# Patient Record
Sex: FEMALE | Race: WHITE | NOT HISPANIC OR LATINO | Employment: OTHER | URBAN - METROPOLITAN AREA
[De-identification: names, ages, dates, MRNs, and addresses within clinical notes are randomized per-mention and may not be internally consistent; named-entity substitution may affect disease eponyms.]

---

## 2021-04-21 ENCOUNTER — OFFICE VISIT (OUTPATIENT)
Dept: URGENT CARE | Facility: PHYSICIAN GROUP | Age: 31
End: 2021-04-21
Payer: COMMERCIAL

## 2021-04-21 VITALS
BODY MASS INDEX: 21.33 KG/M2 | SYSTOLIC BLOOD PRESSURE: 120 MMHG | WEIGHT: 144 LBS | OXYGEN SATURATION: 96 % | HEIGHT: 69 IN | TEMPERATURE: 98.2 F | RESPIRATION RATE: 12 BRPM | DIASTOLIC BLOOD PRESSURE: 72 MMHG | HEART RATE: 77 BPM

## 2021-04-21 DIAGNOSIS — R06.02 SOB (SHORTNESS OF BREATH): ICD-10-CM

## 2021-04-21 DIAGNOSIS — J30.2 SEASONAL ALLERGIES: ICD-10-CM

## 2021-04-21 PROCEDURE — 99203 OFFICE O/P NEW LOW 30 MIN: CPT | Performed by: PHYSICIAN ASSISTANT

## 2021-04-21 RX ORDER — ALBUTEROL SULFATE 90 UG/1
2 AEROSOL, METERED RESPIRATORY (INHALATION) EVERY 6 HOURS PRN
Qty: 8.5 G | Refills: 0 | Status: SHIPPED | OUTPATIENT
Start: 2021-04-21

## 2021-04-21 ASSESSMENT — ENCOUNTER SYMPTOMS
SPUTUM PRODUCTION: 0
DIZZINESS: 0
VOMITING: 0
SHORTNESS OF BREATH: 0
NAUSEA: 0
ABDOMINAL PAIN: 0
FEVER: 0
CHILLS: 0
DIARRHEA: 0
COUGH: 1
MUSCULOSKELETAL NEGATIVE: 1
RHINORRHEA: 1
WHEEZING: 0
HEADACHES: 0
SORE THROAT: 0
CARDIOVASCULAR NEGATIVE: 1

## 2021-04-21 NOTE — PROGRESS NOTES
Subjective:      Lenore Beard is a 30 y.o. female who presents with Cough (runny nose x 7 days)            Seasonal allergies for last 7 days.  Clear rhinorrhea with dry cough.  Itchy eyes and throat.  Some tight chest without significant shortness of breath.  No fever, chills, sore throat, wheezing.  No vomiting or diarrhea.  No dark-colored mucus.  Patient is breast-feeding and has been hesitant to take OTC meds.  Long history of seasonal allergies with subsequent bronchitis.  No sick contacts.  No loss of taste or smell.    Cough  This is a new problem. The current episode started in the past 7 days. The problem has been unchanged. The problem occurs constantly. The cough is non-productive. Associated symptoms include postnasal drip and rhinorrhea. Pertinent negatives include no chills, ear pain, fever, headaches, nasal congestion, rash, sore throat, shortness of breath or wheezing. The symptoms are aggravated by pollens. Treatments tried: NSAIDS. The treatment provided mild relief. Her past medical history is significant for environmental allergies. There is no history of asthma or pneumonia.       PMH:  has no past medical history on file.  MEDS: No current outpatient medications on file.  ALLERGIES:   Allergies   Allergen Reactions   • Amoxicillin Hives     Pt broke out in hives after taking   • Pcn [Penicillins] Hives     Patient broke out in hives after taking     SURGHX: No past surgical history on file.  SOCHX:  reports that she has never smoked. She has never used smokeless tobacco.  FH: family history is not on file.      Review of Systems   Constitutional: Negative for chills and fever.   HENT: Positive for congestion (clear), postnasal drip and rhinorrhea. Negative for ear pain and sore throat.    Respiratory: Positive for cough. Negative for sputum production, shortness of breath and wheezing.    Cardiovascular: Negative.    Gastrointestinal: Negative for abdominal pain, diarrhea, nausea and vomiting.  "  Musculoskeletal: Negative.    Skin: Negative for rash.   Neurological: Negative for dizziness and headaches.   Endo/Heme/Allergies: Positive for environmental allergies.       Medications, Allergies, and current problem list reviewed today in Epic     Objective:     /72 (BP Location: Left arm, Patient Position: Sitting, BP Cuff Size: Adult)   Pulse 77   Temp 36.8 °C (98.2 °F)   Resp 12   Ht 1.753 m (5' 9\")   Wt 65.3 kg (144 lb)   SpO2 96%   BMI 21.27 kg/m²      Physical Exam  Vitals and nursing note reviewed.   Constitutional:       General: She is not in acute distress.     Appearance: She is well-developed. She is not ill-appearing, toxic-appearing or diaphoretic.   HENT:      Head: Normocephalic and atraumatic.      Right Ear: Tympanic membrane, ear canal and external ear normal.      Left Ear: Tympanic membrane, ear canal and external ear normal.      Nose: Rhinorrhea present. No congestion.      Mouth/Throat:      Mouth: Mucous membranes are moist.      Pharynx: No oropharyngeal exudate or posterior oropharyngeal erythema.   Eyes:      General:         Right eye: No discharge.         Left eye: No discharge.      Conjunctiva/sclera: Conjunctivae normal.   Cardiovascular:      Rate and Rhythm: Normal rate and regular rhythm.      Pulses: Normal pulses.      Heart sounds: Normal heart sounds.   Pulmonary:      Effort: Pulmonary effort is normal. No respiratory distress.      Breath sounds: Normal breath sounds. No wheezing, rhonchi or rales.   Musculoskeletal:         General: No swelling or tenderness. Normal range of motion.      Cervical back: Normal range of motion and neck supple.      Right lower leg: No edema.      Left lower leg: No edema.   Lymphadenopathy:      Cervical: No cervical adenopathy.   Skin:     General: Skin is warm and dry.   Neurological:      Mental Status: She is alert and oriented to person, place, and time.   Psychiatric:         Mood and Affect: Mood normal.         " Behavior: Behavior normal.         Thought Content: Thought content normal.         Judgment: Judgment normal.                 Assessment/Plan:         1. Seasonal allergies  albuterol 108 (90 Base) MCG/ACT Aero Soln inhalation aerosol   2. SOB (shortness of breath)  albuterol 108 (90 Base) MCG/ACT Aero Soln inhalation aerosol     Clear rhinorrhea, dry cough, itchy throat nauseous for the past week.  Long history of seasonal allergies with subsequent bronchitis.  Patient does note some tight breathing but denies significant shortness of breath or wheezing.  No infectious symptoms including fever, chills, bodies, vomiting, diarrhea.  Mucus is clear.  No sick contacts.  No loss of taste or smell.  PO2 96% vital signs normal.  Lungs clear bilateral without wheezes rhonchi or rales.  Some clear rhinorrhea noted on exam otherwise benign.  Patient is breast-feeding therefore we will withhold most medications at this time.  She will start Zyrtec or Claritin and use albuterol as needed.  No indication for steroids or antibiotics at this time.  OTC meds and conservative measures as discussed    Return to clinic or go to ED if symptoms worsen or persist. Indications for ED discussed at length. Patient/Parent/Guardian voices understanding. Follow-up with your primary care provider in 3-5 days. Red flag symptoms discussed. All side effects of medication discussed including allergic response, GI upset, tendon injury, rash, sedation etc.    Please note that this dictation was created using voice recognition software. I have made every reasonable attempt to correct obvious errors, but I expect that there are errors of grammar and possibly content that I did not discover before finalizing the note.

## 2021-09-21 ENCOUNTER — OFFICE VISIT (OUTPATIENT)
Dept: URGENT CARE | Facility: PHYSICIAN GROUP | Age: 31
End: 2021-09-21
Payer: COMMERCIAL

## 2021-09-21 ENCOUNTER — HOSPITAL ENCOUNTER (OUTPATIENT)
Facility: MEDICAL CENTER | Age: 31
End: 2021-09-21
Attending: NURSE PRACTITIONER
Payer: COMMERCIAL

## 2021-09-21 VITALS
HEART RATE: 109 BPM | WEIGHT: 135 LBS | SYSTOLIC BLOOD PRESSURE: 132 MMHG | RESPIRATION RATE: 16 BRPM | HEIGHT: 69 IN | TEMPERATURE: 99.2 F | OXYGEN SATURATION: 100 % | BODY MASS INDEX: 19.99 KG/M2 | DIASTOLIC BLOOD PRESSURE: 90 MMHG

## 2021-09-21 DIAGNOSIS — J02.0 STREPTOCOCCAL PHARYNGITIS: Primary | ICD-10-CM

## 2021-09-21 DIAGNOSIS — R21 RASH: ICD-10-CM

## 2021-09-21 DIAGNOSIS — Z20.822 SUSPECTED COVID-19 VIRUS INFECTION: ICD-10-CM

## 2021-09-21 DIAGNOSIS — R68.89 FLU-LIKE SYMPTOMS: ICD-10-CM

## 2021-09-21 DIAGNOSIS — T50.B95A ADVERSE REACTION TO COVID-19 VACCINE: ICD-10-CM

## 2021-09-21 LAB
AMBIGUOUS DTTM AMBI4: NORMAL
INT CON NEG: NORMAL
INT CON POS: NORMAL
S PYO AG THROAT QL: POSITIVE

## 2021-09-21 PROCEDURE — 99214 OFFICE O/P EST MOD 30 MIN: CPT | Mod: CS | Performed by: NURSE PRACTITIONER

## 2021-09-21 PROCEDURE — 87880 STREP A ASSAY W/OPTIC: CPT | Performed by: NURSE PRACTITIONER

## 2021-09-21 PROCEDURE — 0240U HCHG SARS-COV-2 COVID-19 NFCT DS RESP RNA 3 TRGT MIC: CPT

## 2021-09-21 RX ORDER — CEFDINIR 300 MG/1
300 CAPSULE ORAL 2 TIMES DAILY
Qty: 20 CAPSULE | Refills: 0 | Status: SHIPPED | OUTPATIENT
Start: 2021-09-21 | End: 2021-09-22

## 2021-09-21 RX ORDER — TRIAMCINOLONE ACETONIDE 1 MG/G
1 CREAM TOPICAL 2 TIMES DAILY
Qty: 45 G | Refills: 0 | Status: SHIPPED | OUTPATIENT
Start: 2021-09-21 | End: 2021-09-22

## 2021-09-21 ASSESSMENT — ENCOUNTER SYMPTOMS
ABDOMINAL PAIN: 0
CHILLS: 1
NAUSEA: 0
DIARRHEA: 0
FEVER: 1
HEADACHES: 0
VOMITING: 0
SINUS PAIN: 1
MYALGIAS: 1
COUGH: 0
SORE THROAT: 0

## 2021-09-22 ENCOUNTER — TELEPHONE (OUTPATIENT)
Dept: URGENT CARE | Facility: PHYSICIAN GROUP | Age: 31
End: 2021-09-22

## 2021-09-22 LAB
FLUAV RNA SPEC QL NAA+PROBE: NEGATIVE
FLUBV RNA SPEC QL NAA+PROBE: NEGATIVE
SARS-COV-2 RNA RESP QL NAA+PROBE: NOTDETECTED
SPECIMEN SOURCE: NORMAL

## 2021-09-22 RX ORDER — CEFDINIR 300 MG/1
300 CAPSULE ORAL 2 TIMES DAILY
Qty: 20 CAPSULE | Refills: 0 | Status: SHIPPED | OUTPATIENT
Start: 2021-09-22 | End: 2021-10-02

## 2021-09-22 RX ORDER — TRIAMCINOLONE ACETONIDE 1 MG/G
1 CREAM TOPICAL 2 TIMES DAILY
Qty: 45 G | Refills: 0 | Status: SHIPPED | OUTPATIENT
Start: 2021-09-22 | End: 2021-09-29

## 2021-09-22 NOTE — TELEPHONE ENCOUNTER
Pt called requesting her rx's be re-sent to [facility] on [address] due to insurance coverage, I changed already in her chart to the new pharmacy. Thank you!! sj

## 2021-09-22 NOTE — PATIENT INSTRUCTIONS
Strep Throat, Adult  Strep throat is an infection in the throat that is caused by bacteria. It is common during the cold months of the year. It mostly affects children who are 5-15 years old. However, people of all ages can get it at any time of the year. This infection spreads from person to person (is contagious) through coughing, sneezing, or having close contact.  Your health care provider may use other names to describe the infection. It can be called tonsillitis (if there is swelling of the tonsils), or pharyngitis (if there is swelling at the back of the throat).  What are the causes?  This condition is caused by the Streptococcus pyogenes bacteria.  What increases the risk?  You are more likely to develop this condition if:  · You care for school-age children, or are around school-age children. Children are more likely to get strep throat and may spread it to others.  · You spend time in crowded places where the infection can spread easily.  · You have close contact with someone who has strep throat.  What are the signs or symptoms?  Symptoms of this condition include:  · Fever or chills.  · Redness, swelling, or pain in the tonsils or throat.  · Pain or difficulty when swallowing.  · White or yellow spots on the tonsils or throat.  · Tender glands in the neck and under the jaw.  · Bad smelling breath.  · Red rash all over the body. This is rare.  How is this diagnosed?  This condition is diagnosed by tests that check for the presence and the amount of bacteria that cause strep throat. They are:  · Rapid strep test. Your throat is swabbed and checked for the presence of bacteria. Results are usually ready in minutes.  · Throat culture test. Your throat is swabbed. The sample is placed in a cup that allows infections to grow. Results are usually ready in 1 or 2 days.  How is this treated?  This condition may be treated with:  · Medicines that kill germs (antibiotics).  · Medicines that relieve pain or fever.  These include:  ? Ibuprofen or acetaminophen.  ? Aspirin, only for patients who are over the age of 18.  ? Throat lozenges.  ? Throat sprays.  Follow these instructions at home:  Medicines    · Take over-the-counter and prescription medicines only as told by your health care provider.  · Take your antibiotic medicine as told by your health care provider. Do not stop taking the antibiotic even if you start to feel better.  Eating and drinking    · If you have trouble swallowing, try eating soft foods until your sore throat feels better.  · Drink enough fluid to keep your urine pale yellow.  · To help relieve pain, you may have:  ? Warm fluids, such as soup and tea.  ? Cold fluids, such as frozen desserts or popsicles.  General instructions  · Gargle with a salt-water mixture 3-4 times a day or as needed. To make a salt-water mixture, completely dissolve ½-1 tsp (3-6 g) of salt in 1 cup (237 mL) of warm water.  · Get plenty of rest.  · Stay home from work or school until you have been taking antibiotics for 24 hours.  · Avoid smoking or being around people who smoke.  · Keep all follow-up visits as told by your health care provider. This is important.  How is this prevented?    · Do not share food, drinking cups, or personal items that could cause the infection to spread to other people.  · Wash your hands well with soap and water, and make sure that all people in your house wash their hands well.  · Have family members tested if they have a sore throat or fever. They may need an antibiotic if they have strep throat.  Contact a health care provider if:  · The glands in your neck continue to get bigger.  · You develop a rash, cough, or earache.  · You cough up a thick mucus that is green, yellow-brown, or bloody.  · You have pain or discomfort that does not get better with medicine.  · Your symptoms seem to be getting worse and not better.  · You have a fever.  Get help right away if:  · You have new symptoms, such as  vomiting, severe headache, stiff or painful neck, chest pain, or shortness of breath.  · You have severe throat pain, drooling, or changes in your voice.  · You have swelling of the neck, or the skin on the neck becomes red and tender.  · You have signs of dehydration, such as tiredness (fatigue), dry mouth, and decreased urination.  · You become increasingly sleepy, or you cannot wake up completely.  · Your joints become red or painful.  Summary  · Strep throat is an infection in the throat that is caused by the Streptococcus pyogenes bacteria. This infection is spread from person to person (is contagious) through coughing, sneezing, or having close contact.  · Take your medicines, including antibiotics, as told by your health care provider. Do not stop taking the antibiotic even if you start to feel better.  · To prevent the spread of germs, wash your hands well with soap and water. Have others do the same. Do not share food, drinking cups, or personal items.  · Get help right away if you have new symptoms, such as vomiting, severe headache, stiff or painful neck, chest pain, or shortness of breath.  This information is not intended to replace advice given to you by your health care provider. Make sure you discuss any questions you have with your health care provider.  Document Released: 12/15/2001 Document Revised: 03/06/2020 Document Reviewed: 03/06/2020  [employer] Patient Education © 2020 [employer]

## 2021-09-22 NOTE — PROGRESS NOTES
Subjective:     Lenore Beard is a 31 y.o. female who presents for Arm Pain (R arm redness, fever /got the vaccine 8 days ago )      HPI  Pt presents for evaluation of a new problem. Lenore is a pleasant 31 year old female who presents to  today for a possible adverse reaction to COVID vaccination. She received her COVID vaccination 8 days ago. Today she developed fever of 101.1 and a rash at her injection site. She denies any pain assciciated with her rash. She did apply hydrocortisone cream which she states made her rash itch.. Associated symptoms include chills, sinus pain and myalgias.  Her daughter was seen at her pediatric physician's office and diagnosed with RSV although no testing was done in the clinic.  She denies any known ill contacts.  Negative for cough, diarrhea, nausea/vomiting.     Review of Systems   Constitutional: Positive for chills and fever. Negative for malaise/fatigue.   HENT: Positive for sinus pain. Negative for sore throat.    Respiratory: Negative for cough.    Gastrointestinal: Negative for abdominal pain, diarrhea, nausea and vomiting.   Musculoskeletal: Positive for myalgias.   Neurological: Negative for headaches.       PMH: History reviewed. No pertinent past medical history.  ALLERGIES:   Allergies   Allergen Reactions   • Amoxicillin Hives     Pt broke out in hives after taking   • Penicillins Hives     Patient broke out in hives after taking     SURGHX: History reviewed. No pertinent surgical history.  SOCHX:   Social History     Socioeconomic History   • Marital status:      Spouse name: Not on file   • Number of children: Not on file   • Years of education: Not on file   • Highest education level: Not on file   Occupational History   • Not on file   Tobacco Use   • Smoking status: Never Smoker   • Smokeless tobacco: Never Used   Substance and Sexual Activity   • Alcohol use: Not Currently   • Drug use: Not on file   • Sexual activity: Not on file   Other Topics  "Concern   • Not on file   Social History Narrative   • Not on file     Social Determinants of Health     Financial Resource Strain:    • Difficulty of Paying Living Expenses:    Food Insecurity:    • Worried About Running Out of Food in the Last Year:    • Ran Out of Food in the Last Year:    Transportation Needs:    • Lack of Transportation (Medical):    • Lack of Transportation (Non-Medical):    Physical Activity:    • Days of Exercise per Week:    • Minutes of Exercise per Session:    Stress:    • Feeling of Stress :    Social Connections:    • Frequency of Communication with Friends and Family:    • Frequency of Social Gatherings with Friends and Family:    • Attends Muslim Services:    • Active Member of Clubs or Organizations:    • Attends Club or Organization Meetings:    • Marital Status:    Intimate Partner Violence:    • Fear of Current or Ex-Partner:    • Emotionally Abused:    • Physically Abused:    • Sexually Abused:      FH: History reviewed. No pertinent family history.      Objective:   /90   Pulse (!) 109   Temp 37.3 °C (99.2 °F) (Temporal)   Resp 16   Ht 1.753 m (5' 9\")   Wt 61.2 kg (135 lb)   SpO2 100%   Breastfeeding Yes   BMI 19.94 kg/m²     Physical Exam  Vitals and nursing note reviewed.   Constitutional:       General: She is not in acute distress.     Appearance: Normal appearance. She is normal weight. She is ill-appearing. She is not toxic-appearing.   HENT:      Head: Normocephalic and atraumatic.      Right Ear: Tympanic membrane, ear canal and external ear normal. There is no impacted cerumen.      Left Ear: Tympanic membrane, ear canal and external ear normal. There is no impacted cerumen.      Nose: No congestion or rhinorrhea.      Mouth/Throat:      Mouth: Mucous membranes are moist.      Pharynx: Uvula midline. Posterior oropharyngeal erythema present. No pharyngeal swelling, oropharyngeal exudate or uvula swelling.      Tonsils: No tonsillar exudate or tonsillar " abscesses. 1+ on the right. 1+ on the left.   Eyes:      Extraocular Movements: Extraocular movements intact.      Pupils: Pupils are equal, round, and reactive to light.   Cardiovascular:      Rate and Rhythm: Normal rate and regular rhythm.      Pulses: Normal pulses.      Heart sounds: Normal heart sounds.   Pulmonary:      Effort: Pulmonary effort is normal.      Breath sounds: Normal breath sounds.   Abdominal:      General: Abdomen is flat. Bowel sounds are normal.      Palpations: Abdomen is soft.      Tenderness: There is no abdominal tenderness. There is no right CVA tenderness or left CVA tenderness.   Musculoskeletal:         General: Normal range of motion.      Cervical back: Normal range of motion and neck supple. Tenderness present.   Lymphadenopathy:      Cervical: Cervical adenopathy present.   Skin:     General: Skin is warm and dry.      Capillary Refill: Capillary refill takes less than 2 seconds.      Comments: Annular macular, erythemic rash present over injection site of right deltoid.  Negative for pain or pruritus.   Neurological:      General: No focal deficit present.      Mental Status: She is alert and oriented to person, place, and time. Mental status is at baseline.   Psychiatric:         Mood and Affect: Mood normal.         Behavior: Behavior normal.         Thought Content: Thought content normal.         Judgment: Judgment normal.       POCT strep: Positive  Assessment/Plan:   Assessment    1. Streptococcal pharyngitis  cefdinir (OMNICEF) 300 MG Cap    POCT Rapid Strep A   2. Suspected COVID-19 virus infection  CoV-2 and Flu A/B by PCR (24 hour In-House): Collect NP swab in VTM   3. Flu-like symptoms  CoV-2 and Flu A/B by PCR (24 hour In-House): Collect NP swab in VTM   4. Adverse reaction to COVID-19 vaccine  triamcinolone acetonide (KENALOG) 0.1 % Cream   5. Rash  triamcinolone acetonide (KENALOG) 0.1 % Cream     We discussed differential diagnoses.  It is possible that she has  more than 1 illness in place.  She was treated for strep infection today due to her positive strep test and fever.  I am also concerned for concurrent Covid infection as she was only vaccinated 8 days ago.  She was tested for Covid and flu in the clinic today.  Isolation guidelines reviewed.  Triamcinolone given for relief of rash.  She was also educated to use over-the-counter Benadryl and/or Claritin.  Patient to follow-up for worsening or persistent symptoms.  AVS handout given and reviewed with patient. Pt educated on red flags and when to seek treatment back in ER or UC.

## 2022-03-29 ENCOUNTER — HOSPITAL ENCOUNTER (OUTPATIENT)
Dept: LAB | Facility: MEDICAL CENTER | Age: 32
End: 2022-03-29
Attending: PHYSICIAN ASSISTANT
Payer: COMMERCIAL

## 2022-03-29 PROCEDURE — 88175 CYTOPATH C/V AUTO FLUID REDO: CPT

## 2022-03-30 LAB — CYTOLOGY REG CYTOL: NORMAL

## 2022-07-21 ENCOUNTER — HOSPITAL ENCOUNTER (OUTPATIENT)
Facility: MEDICAL CENTER | Age: 32
End: 2022-07-21
Attending: OBSTETRICS & GYNECOLOGY
Payer: COMMERCIAL

## 2022-07-21 PROCEDURE — 87491 CHLMYD TRACH DNA AMP PROBE: CPT

## 2022-07-21 PROCEDURE — 87591 N.GONORRHOEAE DNA AMP PROB: CPT

## 2022-07-22 LAB
C TRACH DNA GENITAL QL NAA+PROBE: NEGATIVE
N GONORRHOEA DNA GENITAL QL NAA+PROBE: NEGATIVE
SPECIMEN SOURCE: NORMAL

## 2022-08-18 ENCOUNTER — HOSPITAL ENCOUNTER (OUTPATIENT)
Dept: LAB | Facility: MEDICAL CENTER | Age: 32
End: 2022-08-18
Attending: OBSTETRICS & GYNECOLOGY
Payer: COMMERCIAL

## 2022-08-18 LAB
ABO GROUP BLD: NORMAL
BASOPHILS # BLD AUTO: 0.6 % (ref 0–1.8)
BASOPHILS # BLD: 0.03 K/UL (ref 0–0.12)
BLD GP AB SCN SERPL QL: NORMAL
EOSINOPHIL # BLD AUTO: 0.07 K/UL (ref 0–0.51)
EOSINOPHIL NFR BLD: 1.3 % (ref 0–6.9)
ERYTHROCYTE [DISTWIDTH] IN BLOOD BY AUTOMATED COUNT: 46.9 FL (ref 35.9–50)
HBV SURFACE AG SER QL: NORMAL
HCT VFR BLD AUTO: 33.6 % (ref 37–47)
HCV AB SER QL: NORMAL
HGB BLD-MCNC: 10.1 G/DL (ref 12–16)
HIV 1+2 AB+HIV1 P24 AG SERPL QL IA: NORMAL
IMM GRANULOCYTES # BLD AUTO: 0.01 K/UL (ref 0–0.11)
IMM GRANULOCYTES NFR BLD AUTO: 0.2 % (ref 0–0.9)
LYMPHOCYTES # BLD AUTO: 1.28 K/UL (ref 1–4.8)
LYMPHOCYTES NFR BLD: 23.6 % (ref 22–41)
MCH RBC QN AUTO: 21 PG (ref 27–33)
MCHC RBC AUTO-ENTMCNC: 30.1 G/DL (ref 33.6–35)
MCV RBC AUTO: 69.9 FL (ref 81.4–97.8)
MONOCYTES # BLD AUTO: 0.57 K/UL (ref 0–0.85)
MONOCYTES NFR BLD AUTO: 10.5 % (ref 0–13.4)
NEUTROPHILS # BLD AUTO: 3.46 K/UL (ref 2–7.15)
NEUTROPHILS NFR BLD: 63.8 % (ref 44–72)
NRBC # BLD AUTO: 0 K/UL
NRBC BLD-RTO: 0 /100 WBC
PLATELET # BLD AUTO: 231 K/UL (ref 164–446)
PMV BLD AUTO: 11.4 FL (ref 9–12.9)
RBC # BLD AUTO: 4.81 M/UL (ref 4.2–5.4)
RH BLD: NORMAL
RUBV AB SER QL: 9.01 IU/ML
T PALLIDUM AB SER QL IA: NORMAL
WBC # BLD AUTO: 5.4 K/UL (ref 4.8–10.8)

## 2022-08-18 PROCEDURE — 86803 HEPATITIS C AB TEST: CPT

## 2022-08-18 PROCEDURE — 86901 BLOOD TYPING SEROLOGIC RH(D): CPT

## 2022-08-18 PROCEDURE — 86850 RBC ANTIBODY SCREEN: CPT

## 2022-08-18 PROCEDURE — 86780 TREPONEMA PALLIDUM: CPT

## 2022-08-18 PROCEDURE — 87389 HIV-1 AG W/HIV-1&-2 AB AG IA: CPT

## 2022-08-18 PROCEDURE — 87086 URINE CULTURE/COLONY COUNT: CPT

## 2022-08-18 PROCEDURE — 87340 HEPATITIS B SURFACE AG IA: CPT

## 2022-08-18 PROCEDURE — 85025 COMPLETE CBC W/AUTO DIFF WBC: CPT

## 2022-08-18 PROCEDURE — 86762 RUBELLA ANTIBODY: CPT

## 2022-08-18 PROCEDURE — 36415 COLL VENOUS BLD VENIPUNCTURE: CPT

## 2022-08-18 PROCEDURE — 86900 BLOOD TYPING SEROLOGIC ABO: CPT

## 2022-08-20 LAB
BACTERIA UR CULT: NORMAL
SIGNIFICANT IND 70042: NORMAL
SITE SITE: NORMAL
SOURCE SOURCE: NORMAL

## 2022-11-22 ENCOUNTER — HOSPITAL ENCOUNTER (OUTPATIENT)
Dept: LAB | Facility: MEDICAL CENTER | Age: 32
End: 2022-11-22
Attending: OBSTETRICS & GYNECOLOGY
Payer: COMMERCIAL

## 2022-11-22 LAB
GLUCOSE 1H P 50 G GLC PO SERPL-MCNC: 95 MG/DL (ref 70–139)
HCT VFR BLD AUTO: 29.4 % (ref 37–47)
HGB BLD-MCNC: 8.7 G/DL (ref 12–16)
PLATELET # BLD AUTO: 239 K/UL (ref 164–446)
T PALLIDUM AB SER QL IA: NORMAL

## 2022-11-22 PROCEDURE — 85014 HEMATOCRIT: CPT

## 2022-11-22 PROCEDURE — 86780 TREPONEMA PALLIDUM: CPT

## 2022-11-22 PROCEDURE — 82950 GLUCOSE TEST: CPT

## 2022-11-22 PROCEDURE — 36415 COLL VENOUS BLD VENIPUNCTURE: CPT

## 2022-11-22 PROCEDURE — 85018 HEMOGLOBIN: CPT

## 2022-11-22 PROCEDURE — 85049 AUTOMATED PLATELET COUNT: CPT

## 2022-12-27 ENCOUNTER — HOSPITAL ENCOUNTER (OUTPATIENT)
Facility: MEDICAL CENTER | Age: 32
End: 2022-12-27
Attending: OBSTETRICS & GYNECOLOGY
Payer: COMMERCIAL

## 2022-12-27 LAB
ALT SERPL-CCNC: 12 U/L (ref 2–50)
AST SERPL-CCNC: 13 U/L (ref 12–45)
HCT VFR BLD AUTO: 33.5 % (ref 37–47)
HGB BLD-MCNC: 10 G/DL (ref 12–16)

## 2022-12-27 PROCEDURE — 85014 HEMATOCRIT: CPT

## 2022-12-27 PROCEDURE — 84450 TRANSFERASE (AST) (SGOT): CPT

## 2022-12-27 PROCEDURE — 84460 ALANINE AMINO (ALT) (SGPT): CPT

## 2022-12-27 PROCEDURE — 36415 COLL VENOUS BLD VENIPUNCTURE: CPT

## 2022-12-27 PROCEDURE — 85018 HEMOGLOBIN: CPT

## 2023-01-10 ENCOUNTER — HOSPITAL ENCOUNTER (OUTPATIENT)
Dept: LAB | Facility: MEDICAL CENTER | Age: 33
End: 2023-01-10
Attending: OBSTETRICS & GYNECOLOGY
Payer: COMMERCIAL

## 2023-01-10 LAB
ALT SERPL-CCNC: 11 U/L (ref 2–50)
AST SERPL-CCNC: 12 U/L (ref 12–45)

## 2023-01-10 PROCEDURE — 36415 COLL VENOUS BLD VENIPUNCTURE: CPT

## 2023-01-10 PROCEDURE — 84450 TRANSFERASE (AST) (SGOT): CPT

## 2023-01-10 PROCEDURE — 84460 ALANINE AMINO (ALT) (SGPT): CPT

## 2023-02-07 ENCOUNTER — HOSPITAL ENCOUNTER (OUTPATIENT)
Dept: LAB | Facility: MEDICAL CENTER | Age: 33
End: 2023-02-07
Attending: OBSTETRICS & GYNECOLOGY
Payer: COMMERCIAL

## 2023-02-07 ENCOUNTER — HOSPITAL ENCOUNTER (OUTPATIENT)
Facility: MEDICAL CENTER | Age: 33
End: 2023-02-07
Attending: OBSTETRICS & GYNECOLOGY
Payer: COMMERCIAL

## 2023-02-07 ENCOUNTER — HOSPITAL ENCOUNTER (OUTPATIENT)
Dept: LAB | Facility: MEDICAL CENTER | Age: 33
End: 2023-02-07
Attending: OBSTETRICS & GYNECOLOGY

## 2023-02-07 PROCEDURE — 87081 CULTURE SCREEN ONLY: CPT

## 2023-02-07 PROCEDURE — 87150 DNA/RNA AMPLIFIED PROBE: CPT

## 2023-02-08 ENCOUNTER — HOSPITAL ENCOUNTER (OUTPATIENT)
Dept: LAB | Facility: MEDICAL CENTER | Age: 33
End: 2023-02-08
Attending: OBSTETRICS & GYNECOLOGY
Payer: COMMERCIAL

## 2023-02-08 LAB
ALT SERPL-CCNC: 18 U/L (ref 2–50)
AST SERPL-CCNC: 18 U/L (ref 12–45)
CREAT SERPL-MCNC: 0.6 MG/DL (ref 0.5–1.4)
GFR SERPLBLD CREATININE-BSD FMLA CKD-EPI: 123 ML/MIN/1.73 M 2
GP B STREP DNA SPEC QL NAA+PROBE: NEGATIVE
PLATELET # BLD AUTO: 176 K/UL (ref 164–446)
URATE SERPL-MCNC: 6.5 MG/DL (ref 1.9–8.2)

## 2023-02-08 PROCEDURE — 85049 AUTOMATED PLATELET COUNT: CPT

## 2023-02-08 PROCEDURE — 82565 ASSAY OF CREATININE: CPT

## 2023-02-08 PROCEDURE — 84460 ALANINE AMINO (ALT) (SGPT): CPT

## 2023-02-08 PROCEDURE — 84550 ASSAY OF BLOOD/URIC ACID: CPT

## 2023-02-08 PROCEDURE — 36415 COLL VENOUS BLD VENIPUNCTURE: CPT

## 2023-02-08 PROCEDURE — 84450 TRANSFERASE (AST) (SGOT): CPT

## 2023-02-22 ENCOUNTER — HOSPITAL ENCOUNTER (OUTPATIENT)
Dept: LAB | Facility: MEDICAL CENTER | Age: 33
End: 2023-02-22
Attending: OBSTETRICS & GYNECOLOGY
Payer: COMMERCIAL

## 2023-02-22 LAB
ALT SERPL-CCNC: 20 U/L (ref 2–50)
AST SERPL-CCNC: 20 U/L (ref 12–45)
CREAT SERPL-MCNC: 0.69 MG/DL (ref 0.5–1.4)
GFR SERPLBLD CREATININE-BSD FMLA CKD-EPI: 119 ML/MIN/1.73 M 2
URATE SERPL-MCNC: 6.9 MG/DL (ref 1.9–8.2)

## 2023-02-22 PROCEDURE — 84460 ALANINE AMINO (ALT) (SGPT): CPT

## 2023-02-22 PROCEDURE — 36415 COLL VENOUS BLD VENIPUNCTURE: CPT

## 2023-02-22 PROCEDURE — 84550 ASSAY OF BLOOD/URIC ACID: CPT

## 2023-02-22 PROCEDURE — 84450 TRANSFERASE (AST) (SGOT): CPT

## 2023-02-22 PROCEDURE — 82565 ASSAY OF CREATININE: CPT

## 2023-02-23 ENCOUNTER — HOSPITAL ENCOUNTER (EMERGENCY)
Facility: MEDICAL CENTER | Age: 33
End: 2023-02-23
Attending: OBSTETRICS & GYNECOLOGY | Admitting: OBSTETRICS & GYNECOLOGY
Payer: COMMERCIAL

## 2023-02-23 VITALS
HEART RATE: 83 BPM | TEMPERATURE: 98.8 F | SYSTOLIC BLOOD PRESSURE: 132 MMHG | DIASTOLIC BLOOD PRESSURE: 83 MMHG | BODY MASS INDEX: 28.58 KG/M2 | HEIGHT: 69 IN | OXYGEN SATURATION: 97 % | WEIGHT: 193 LBS

## 2023-02-23 LAB
APPEARANCE UR: ABNORMAL
COLOR UR AUTO: YELLOW
FLUAV RNA SPEC QL NAA+PROBE: NEGATIVE
FLUBV RNA SPEC QL NAA+PROBE: NEGATIVE
GLUCOSE UR QL STRIP.AUTO: NEGATIVE MG/DL
KETONES UR QL STRIP.AUTO: NEGATIVE MG/DL
LEUKOCYTE ESTERASE UR QL STRIP.AUTO: ABNORMAL
NITRITE UR QL STRIP.AUTO: NEGATIVE
PH UR STRIP.AUTO: 5.5 [PH] (ref 5–8)
PROT UR QL STRIP: 30 MG/DL
RBC UR QL AUTO: ABNORMAL
RSV RNA SPEC QL NAA+PROBE: NEGATIVE
SARS-COV-2 RNA RESP QL NAA+PROBE: DETECTED
SP GR UR STRIP.AUTO: 1.02 (ref 1–1.03)
SPECIMEN SOURCE: ABNORMAL

## 2023-02-23 PROCEDURE — 59025 FETAL NON-STRESS TEST: CPT

## 2023-02-23 PROCEDURE — C9803 HOPD COVID-19 SPEC COLLECT: HCPCS | Performed by: OBSTETRICS & GYNECOLOGY

## 2023-02-23 PROCEDURE — 302449 STATCHG TRIAGE ONLY (STATISTIC)

## 2023-02-23 PROCEDURE — 81002 URINALYSIS NONAUTO W/O SCOPE: CPT

## 2023-02-23 PROCEDURE — 0241U HCHG SARS-COV-2 COVID-19 NFCT DS RESP RNA 4 TRGT MIC: CPT

## 2023-02-23 ASSESSMENT — FIBROSIS 4 INDEX: FIB4 SCORE: 0.76

## 2023-02-24 NOTE — PROGRESS NOTES
Pt presents to L&D with complaints of elevated BP at home and cold/flu like symptoms. Pt ambulated to LDA 5 for assessment.     1630 TOCO and EFM applied, VSS. RN will take serial BP's for further evaluation. Upon placing monitors, fetal movement caused the pt to abruptly bring her legs up in response, see FHT, variable occurred but quickly resolved once pt lower her legs. Audible FM heard by RN, RN will update Dr. Kang/Misha. Pt reports monitoring her BP at home 140/80's. Pt did have labs drawn yesterday, see results, all WNL.     1655 Dr. Kang updated, orders for serial BP's and COVID/Flu/RSV swab.     1700 RN at bedside, swab collected and pt updated.     1805 COVID +, RN at bedside, precautions given and all questions answered. Pt educated on medication options, pt declines at this time and would like to wait and see if her symptoms get worse over night. Instructions on when to return to L&D or the ED given and all questions answered.     1812 Dr. Cabral updated, pt to start taking an 81mg aspirin and to call Dr. Gastelum if she changes her mind on the medication for COVID. FHT reviewed, FHT very reassuring after initial variable decel. Okay to discharge.     1820 RN at bedside, pt denies any further questions.     1828 Pt discharged home in stable condition.

## 2023-02-27 ENCOUNTER — ANESTHESIA (OUTPATIENT)
Dept: ANESTHESIOLOGY | Facility: MEDICAL CENTER | Age: 33
End: 2023-02-27
Payer: COMMERCIAL

## 2023-02-27 ENCOUNTER — ANESTHESIA EVENT (OUTPATIENT)
Dept: ANESTHESIOLOGY | Facility: MEDICAL CENTER | Age: 33
End: 2023-02-27
Payer: COMMERCIAL

## 2023-02-27 ENCOUNTER — HOSPITAL ENCOUNTER (INPATIENT)
Facility: MEDICAL CENTER | Age: 33
LOS: 2 days | End: 2023-03-01
Attending: OBSTETRICS & GYNECOLOGY | Admitting: OBSTETRICS & GYNECOLOGY
Payer: COMMERCIAL

## 2023-02-27 DIAGNOSIS — Z78.9 BREASTFED INFANT: ICD-10-CM

## 2023-02-27 DIAGNOSIS — D62 ANEMIA ASSOCIATED WITH ACUTE BLOOD LOSS: ICD-10-CM

## 2023-02-27 LAB
ABO GROUP BLD: NORMAL
ALT SERPL-CCNC: 33 U/L (ref 2–50)
APTT PPP: 32 SEC (ref 24.7–36)
AST SERPL-CCNC: 34 U/L (ref 12–45)
BASOPHILS # BLD AUTO: 0.5 % (ref 0–1.8)
BASOPHILS # BLD: 0.05 K/UL (ref 0–0.12)
BLD GP AB SCN SERPL QL: NORMAL
BUN SERPL-MCNC: 13 MG/DL (ref 8–22)
CREAT SERPL-MCNC: 0.66 MG/DL (ref 0.5–1.4)
CREAT UR-MCNC: 95.36 MG/DL
EOSINOPHIL # BLD AUTO: 0.11 K/UL (ref 0–0.51)
EOSINOPHIL NFR BLD: 1.1 % (ref 0–6.9)
ERYTHROCYTE [DISTWIDTH] IN BLOOD BY AUTOMATED COUNT: 48.5 FL (ref 35.9–50)
ERYTHROCYTE [DISTWIDTH] IN BLOOD BY AUTOMATED COUNT: 49.4 FL (ref 35.9–50)
FIBRINOGEN PPP-MCNC: 342 MG/DL (ref 215–460)
GFR SERPLBLD CREATININE-BSD FMLA CKD-EPI: 120 ML/MIN/1.73 M 2
HCT VFR BLD AUTO: 28.8 % (ref 37–47)
HCT VFR BLD AUTO: 35.8 % (ref 37–47)
HGB BLD-MCNC: 11 G/DL (ref 12–16)
HGB BLD-MCNC: 8.9 G/DL (ref 12–16)
HOLDING TUBE BB 8507: NORMAL
IMM GRANULOCYTES # BLD AUTO: 0.1 K/UL (ref 0–0.11)
IMM GRANULOCYTES NFR BLD AUTO: 1 % (ref 0–0.9)
INR PPP: 1.02 (ref 0.87–1.13)
LYMPHOCYTES # BLD AUTO: 3.08 K/UL (ref 1–4.8)
LYMPHOCYTES NFR BLD: 30.5 % (ref 22–41)
MCH RBC QN AUTO: 23.2 PG (ref 27–33)
MCH RBC QN AUTO: 23.8 PG (ref 27–33)
MCHC RBC AUTO-ENTMCNC: 30.7 G/DL (ref 33.6–35)
MCHC RBC AUTO-ENTMCNC: 30.9 G/DL (ref 33.6–35)
MCV RBC AUTO: 75.4 FL (ref 81.4–97.8)
MCV RBC AUTO: 77 FL (ref 81.4–97.8)
MONOCYTES # BLD AUTO: 0.7 K/UL (ref 0–0.85)
MONOCYTES NFR BLD AUTO: 6.9 % (ref 0–13.4)
NEUTROPHILS # BLD AUTO: 6.07 K/UL (ref 2–7.15)
NEUTROPHILS NFR BLD: 60 % (ref 44–72)
NRBC # BLD AUTO: 0 K/UL
NRBC BLD-RTO: 0 /100 WBC
PLATELET # BLD AUTO: 141 K/UL (ref 164–446)
PLATELET # BLD AUTO: 180 K/UL (ref 164–446)
PMV BLD AUTO: 11.8 FL (ref 9–12.9)
PMV BLD AUTO: 12.7 FL (ref 9–12.9)
PROT UR-MCNC: 19 MG/DL (ref 0–15)
PROT/CREAT UR: 199 MG/G (ref 10–107)
PROTHROMBIN TIME: 13.3 SEC (ref 12–14.6)
RBC # BLD AUTO: 3.74 M/UL (ref 4.2–5.4)
RBC # BLD AUTO: 4.75 M/UL (ref 4.2–5.4)
RH BLD: NORMAL
T PALLIDUM AB SER QL IA: NORMAL
WBC # BLD AUTO: 10.1 K/UL (ref 4.8–10.8)
WBC # BLD AUTO: 11.9 K/UL (ref 4.8–10.8)

## 2023-02-27 PROCEDURE — 82565 ASSAY OF CREATININE: CPT

## 2023-02-27 PROCEDURE — 01967 NEURAXL LBR ANES VAG DLVR: CPT | Performed by: INTERNAL MEDICINE

## 2023-02-27 PROCEDURE — 700102 HCHG RX REV CODE 250 W/ 637 OVERRIDE(OP): Performed by: OBSTETRICS & GYNECOLOGY

## 2023-02-27 PROCEDURE — 304965 HCHG RECOVERY SERVICES

## 2023-02-27 PROCEDURE — 84460 ALANINE AMINO (ALT) (SGPT): CPT

## 2023-02-27 PROCEDURE — 85610 PROTHROMBIN TIME: CPT

## 2023-02-27 PROCEDURE — 85027 COMPLETE CBC AUTOMATED: CPT

## 2023-02-27 PROCEDURE — 86780 TREPONEMA PALLIDUM: CPT

## 2023-02-27 PROCEDURE — 86900 BLOOD TYPING SEROLOGIC ABO: CPT

## 2023-02-27 PROCEDURE — 85730 THROMBOPLASTIN TIME PARTIAL: CPT

## 2023-02-27 PROCEDURE — 700105 HCHG RX REV CODE 258: Performed by: INTERNAL MEDICINE

## 2023-02-27 PROCEDURE — 84450 TRANSFERASE (AST) (SGOT): CPT

## 2023-02-27 PROCEDURE — 36415 COLL VENOUS BLD VENIPUNCTURE: CPT

## 2023-02-27 PROCEDURE — 700101 HCHG RX REV CODE 250

## 2023-02-27 PROCEDURE — 84156 ASSAY OF PROTEIN URINE: CPT

## 2023-02-27 PROCEDURE — 700105 HCHG RX REV CODE 258: Performed by: OBSTETRICS & GYNECOLOGY

## 2023-02-27 PROCEDURE — A9270 NON-COVERED ITEM OR SERVICE: HCPCS | Performed by: OBSTETRICS & GYNECOLOGY

## 2023-02-27 PROCEDURE — 0KQM0ZZ REPAIR PERINEUM MUSCLE, OPEN APPROACH: ICD-10-PCS | Performed by: OBSTETRICS & GYNECOLOGY

## 2023-02-27 PROCEDURE — 303615 HCHG EPIDURAL/SPINAL ANESTHESIA FOR LABOR

## 2023-02-27 PROCEDURE — 700101 HCHG RX REV CODE 250: Performed by: INTERNAL MEDICINE

## 2023-02-27 PROCEDURE — 770002 HCHG ROOM/CARE - OB PRIVATE (112)

## 2023-02-27 PROCEDURE — 700101 HCHG RX REV CODE 250: Performed by: OBSTETRICS & GYNECOLOGY

## 2023-02-27 PROCEDURE — 700111 HCHG RX REV CODE 636 W/ 250 OVERRIDE (IP): Performed by: INTERNAL MEDICINE

## 2023-02-27 PROCEDURE — 82570 ASSAY OF URINE CREATININE: CPT

## 2023-02-27 PROCEDURE — 85384 FIBRINOGEN ACTIVITY: CPT

## 2023-02-27 PROCEDURE — 86850 RBC ANTIBODY SCREEN: CPT

## 2023-02-27 PROCEDURE — 59409 OBSTETRICAL CARE: CPT

## 2023-02-27 PROCEDURE — 84520 ASSAY OF UREA NITROGEN: CPT

## 2023-02-27 PROCEDURE — 86901 BLOOD TYPING SEROLOGIC RH(D): CPT

## 2023-02-27 PROCEDURE — 85025 COMPLETE CBC W/AUTO DIFF WBC: CPT

## 2023-02-27 PROCEDURE — 700111 HCHG RX REV CODE 636 W/ 250 OVERRIDE (IP)

## 2023-02-27 PROCEDURE — 700111 HCHG RX REV CODE 636 W/ 250 OVERRIDE (IP): Performed by: OBSTETRICS & GYNECOLOGY

## 2023-02-27 PROCEDURE — 700105 HCHG RX REV CODE 258

## 2023-02-27 RX ORDER — DOCUSATE SODIUM 100 MG/1
100 CAPSULE, LIQUID FILLED ORAL 2 TIMES DAILY PRN
Status: DISCONTINUED | OUTPATIENT
Start: 2023-02-27 | End: 2023-03-01 | Stop reason: HOSPADM

## 2023-02-27 RX ORDER — OXYTOCIN 10 [USP'U]/ML
10 INJECTION, SOLUTION INTRAMUSCULAR; INTRAVENOUS
Status: DISCONTINUED | OUTPATIENT
Start: 2023-02-27 | End: 2023-02-27 | Stop reason: HOSPADM

## 2023-02-27 RX ORDER — SODIUM CHLORIDE, SODIUM LACTATE, POTASSIUM CHLORIDE, CALCIUM CHLORIDE 600; 310; 30; 20 MG/100ML; MG/100ML; MG/100ML; MG/100ML
INJECTION, SOLUTION INTRAVENOUS CONTINUOUS
Status: DISCONTINUED | OUTPATIENT
Start: 2023-02-27 | End: 2023-03-01 | Stop reason: HOSPADM

## 2023-02-27 RX ORDER — OXYCODONE HYDROCHLORIDE 5 MG/1
5 TABLET ORAL
Status: DISCONTINUED | OUTPATIENT
Start: 2023-02-27 | End: 2023-02-27 | Stop reason: HOSPADM

## 2023-02-27 RX ORDER — ONDANSETRON 2 MG/ML
4 INJECTION INTRAMUSCULAR; INTRAVENOUS EVERY 6 HOURS PRN
Status: DISCONTINUED | OUTPATIENT
Start: 2023-02-27 | End: 2023-02-27 | Stop reason: HOSPADM

## 2023-02-27 RX ORDER — LIDOCAINE HYDROCHLORIDE 10 MG/ML
20 INJECTION, SOLUTION INFILTRATION; PERINEURAL
Status: DISCONTINUED | OUTPATIENT
Start: 2023-02-27 | End: 2023-02-27 | Stop reason: HOSPADM

## 2023-02-27 RX ORDER — SODIUM CHLORIDE, SODIUM LACTATE, POTASSIUM CHLORIDE, AND CALCIUM CHLORIDE .6; .31; .03; .02 G/100ML; G/100ML; G/100ML; G/100ML
1000 INJECTION, SOLUTION INTRAVENOUS
Status: COMPLETED | OUTPATIENT
Start: 2023-02-27 | End: 2023-02-27

## 2023-02-27 RX ORDER — NIFEDIPINE 30 MG/1
30 TABLET, EXTENDED RELEASE ORAL
Status: DISCONTINUED | OUTPATIENT
Start: 2023-02-28 | End: 2023-03-01 | Stop reason: HOSPADM

## 2023-02-27 RX ORDER — ROPIVACAINE HYDROCHLORIDE 2 MG/ML
INJECTION, SOLUTION EPIDURAL; INFILTRATION; PERINEURAL
Status: COMPLETED
Start: 2023-02-27 | End: 2023-02-27

## 2023-02-27 RX ORDER — LIDOCAINE HYDROCHLORIDE AND EPINEPHRINE 15; 5 MG/ML; UG/ML
INJECTION, SOLUTION EPIDURAL
Status: COMPLETED | OUTPATIENT
Start: 2023-02-27 | End: 2023-02-27

## 2023-02-27 RX ORDER — NIFEDIPINE 30 MG/1
30 TABLET, EXTENDED RELEASE ORAL
Status: DISCONTINUED | OUTPATIENT
Start: 2023-02-27 | End: 2023-02-27

## 2023-02-27 RX ORDER — CARBOPROST TROMETHAMINE 250 UG/ML
250 INJECTION, SOLUTION INTRAMUSCULAR ONCE
Status: COMPLETED | OUTPATIENT
Start: 2023-02-27 | End: 2023-02-27

## 2023-02-27 RX ORDER — IBUPROFEN 800 MG/1
800 TABLET ORAL
Status: COMPLETED | OUTPATIENT
Start: 2023-02-27 | End: 2023-02-27

## 2023-02-27 RX ORDER — TRANEXAMIC ACID 100 MG/ML
INJECTION, SOLUTION INTRAVENOUS
Status: COMPLETED
Start: 2023-02-27 | End: 2023-02-27

## 2023-02-27 RX ORDER — CARBOPROST TROMETHAMINE 250 UG/ML
INJECTION, SOLUTION INTRAMUSCULAR
Status: COMPLETED
Start: 2023-02-27 | End: 2023-02-27

## 2023-02-27 RX ORDER — SODIUM CHLORIDE 9 MG/ML
INJECTION, SOLUTION INTRAVENOUS
Status: COMPLETED
Start: 2023-02-27 | End: 2023-02-27

## 2023-02-27 RX ORDER — ACETAMINOPHEN 500 MG
1000 TABLET ORAL
Status: DISCONTINUED | OUTPATIENT
Start: 2023-02-27 | End: 2023-02-27 | Stop reason: HOSPADM

## 2023-02-27 RX ORDER — SIMETHICONE 125 MG
125 TABLET,CHEWABLE ORAL 4 TIMES DAILY PRN
Status: DISCONTINUED | OUTPATIENT
Start: 2023-02-27 | End: 2023-03-01 | Stop reason: HOSPADM

## 2023-02-27 RX ORDER — LOPERAMIDE HYDROCHLORIDE 2 MG/1
2 CAPSULE ORAL 4 TIMES DAILY PRN
Status: DISCONTINUED | OUTPATIENT
Start: 2023-02-27 | End: 2023-03-01 | Stop reason: HOSPADM

## 2023-02-27 RX ORDER — EPHEDRINE SULFATE 50 MG/ML
5 INJECTION, SOLUTION INTRAVENOUS
Status: DISCONTINUED | OUTPATIENT
Start: 2023-02-27 | End: 2023-02-27 | Stop reason: HOSPADM

## 2023-02-27 RX ORDER — LABETALOL HYDROCHLORIDE 5 MG/ML
20-80 INJECTION, SOLUTION INTRAVENOUS
Status: DISCONTINUED | OUTPATIENT
Start: 2023-02-27 | End: 2023-02-27 | Stop reason: HOSPADM

## 2023-02-27 RX ORDER — ACETAMINOPHEN 500 MG
1000 TABLET ORAL EVERY 6 HOURS PRN
Status: DISCONTINUED | OUTPATIENT
Start: 2023-02-27 | End: 2023-03-01 | Stop reason: HOSPADM

## 2023-02-27 RX ORDER — SODIUM CHLORIDE, SODIUM LACTATE, POTASSIUM CHLORIDE, CALCIUM CHLORIDE 600; 310; 30; 20 MG/100ML; MG/100ML; MG/100ML; MG/100ML
INJECTION, SOLUTION INTRAVENOUS CONTINUOUS
Status: ACTIVE | OUTPATIENT
Start: 2023-02-27 | End: 2023-02-27

## 2023-02-27 RX ORDER — VITAMIN A ACETATE, BETA CAROTENE, ASCORBIC ACID, CHOLECALCIFEROL, .ALPHA.-TOCOPHEROL ACETATE, DL-, THIAMINE MONONITRATE, RIBOFLAVIN, NIACINAMIDE, PYRIDOXINE HYDROCHLORIDE, FOLIC ACID, CYANOCOBALAMIN, CALCIUM CARBONATE, FERROUS FUMARATE, ZINC OXIDE, CUPRIC OXIDE 3080; 12; 120; 400; 1; 1.84; 3; 20; 22; 920; 25; 200; 27; 10; 2 [IU]/1; UG/1; MG/1; [IU]/1; MG/1; MG/1; MG/1; MG/1; MG/1; [IU]/1; MG/1; MG/1; MG/1; MG/1; MG/1
1 TABLET, FILM COATED ORAL
Status: DISCONTINUED | OUTPATIENT
Start: 2023-02-28 | End: 2023-03-01 | Stop reason: HOSPADM

## 2023-02-27 RX ORDER — ALUMINA, MAGNESIA, AND SIMETHICONE 2400; 2400; 240 MG/30ML; MG/30ML; MG/30ML
30 SUSPENSION ORAL 4 TIMES DAILY PRN
Status: DISCONTINUED | OUTPATIENT
Start: 2023-02-27 | End: 2023-03-01 | Stop reason: HOSPADM

## 2023-02-27 RX ORDER — ONDANSETRON 4 MG/1
4 TABLET, ORALLY DISINTEGRATING ORAL EVERY 6 HOURS PRN
Status: DISCONTINUED | OUTPATIENT
Start: 2023-02-27 | End: 2023-02-27 | Stop reason: HOSPADM

## 2023-02-27 RX ORDER — SODIUM CHLORIDE, SODIUM LACTATE, POTASSIUM CHLORIDE, AND CALCIUM CHLORIDE .6; .31; .03; .02 G/100ML; G/100ML; G/100ML; G/100ML
250 INJECTION, SOLUTION INTRAVENOUS PRN
Status: DISCONTINUED | OUTPATIENT
Start: 2023-02-27 | End: 2023-02-27 | Stop reason: HOSPADM

## 2023-02-27 RX ORDER — CALCIUM CARBONATE 500 MG/1
1000 TABLET, CHEWABLE ORAL EVERY 6 HOURS PRN
Status: DISCONTINUED | OUTPATIENT
Start: 2023-02-27 | End: 2023-03-01 | Stop reason: HOSPADM

## 2023-02-27 RX ORDER — TERBUTALINE SULFATE 1 MG/ML
0.25 INJECTION, SOLUTION SUBCUTANEOUS
Status: DISCONTINUED | OUTPATIENT
Start: 2023-02-27 | End: 2023-02-27 | Stop reason: HOSPADM

## 2023-02-27 RX ORDER — NIFEDIPINE 10 MG/1
10 CAPSULE ORAL
Status: DISCONTINUED | OUTPATIENT
Start: 2023-02-27 | End: 2023-03-01 | Stop reason: HOSPADM

## 2023-02-27 RX ORDER — MISOPROSTOL 200 UG/1
800 TABLET ORAL
Status: COMPLETED | OUTPATIENT
Start: 2023-02-27 | End: 2023-02-27

## 2023-02-27 RX ORDER — OXYCODONE HYDROCHLORIDE 5 MG/1
5 TABLET ORAL EVERY 4 HOURS PRN
Status: DISCONTINUED | OUTPATIENT
Start: 2023-02-27 | End: 2023-03-01 | Stop reason: HOSPADM

## 2023-02-27 RX ORDER — BUPIVACAINE HYDROCHLORIDE 2.5 MG/ML
INJECTION, SOLUTION EPIDURAL; INFILTRATION; INTRACAUDAL
Status: COMPLETED | OUTPATIENT
Start: 2023-02-27 | End: 2023-02-27

## 2023-02-27 RX ORDER — SODIUM CHLORIDE, SODIUM LACTATE, POTASSIUM CHLORIDE, CALCIUM CHLORIDE 600; 310; 30; 20 MG/100ML; MG/100ML; MG/100ML; MG/100ML
INJECTION, SOLUTION INTRAVENOUS PRN
Status: DISCONTINUED | OUTPATIENT
Start: 2023-02-27 | End: 2023-03-01 | Stop reason: HOSPADM

## 2023-02-27 RX ORDER — HYDRALAZINE HYDROCHLORIDE 20 MG/ML
5-10 INJECTION INTRAMUSCULAR; INTRAVENOUS
Status: DISCONTINUED | OUTPATIENT
Start: 2023-02-27 | End: 2023-02-27 | Stop reason: HOSPADM

## 2023-02-27 RX ORDER — NIFEDIPINE 10 MG/1
10 CAPSULE ORAL
Status: DISCONTINUED | OUTPATIENT
Start: 2023-02-27 | End: 2023-02-27 | Stop reason: HOSPADM

## 2023-02-27 RX ORDER — ROPIVACAINE HYDROCHLORIDE 2 MG/ML
INJECTION, SOLUTION EPIDURAL; INFILTRATION; PERINEURAL CONTINUOUS
Status: DISCONTINUED | OUTPATIENT
Start: 2023-02-27 | End: 2023-03-01 | Stop reason: HOSPADM

## 2023-02-27 RX ORDER — IBUPROFEN 800 MG/1
800 TABLET ORAL EVERY 8 HOURS PRN
Status: DISCONTINUED | OUTPATIENT
Start: 2023-02-27 | End: 2023-03-01 | Stop reason: HOSPADM

## 2023-02-27 RX ADMIN — ROPIVACAINE HYDROCHLORIDE 200 MG: 2 INJECTION, SOLUTION EPIDURAL; INFILTRATION; PERINEURAL at 04:31

## 2023-02-27 RX ADMIN — IBUPROFEN 800 MG: 800 TABLET, FILM COATED ORAL at 12:45

## 2023-02-27 RX ADMIN — NIFEDIPINE 30 MG: 30 TABLET, FILM COATED, EXTENDED RELEASE ORAL at 19:38

## 2023-02-27 RX ADMIN — LABETALOL HYDROCHLORIDE 20 MG: 5 INJECTION, SOLUTION INTRAVENOUS at 13:00

## 2023-02-27 RX ADMIN — LIDOCAINE HYDROCHLORIDE,EPINEPHRINE BITARTRATE 5 ML: 15; .005 INJECTION, SOLUTION EPIDURAL; INFILTRATION; INTRACAUDAL; PERINEURAL at 04:18

## 2023-02-27 RX ADMIN — FENTANYL CITRATE 100 MCG: 50 INJECTION, SOLUTION INTRAMUSCULAR; INTRAVENOUS at 04:18

## 2023-02-27 RX ADMIN — NIFEDIPINE 10 MG: 10 CAPSULE ORAL at 13:57

## 2023-02-27 RX ADMIN — SODIUM CHLORIDE, POTASSIUM CHLORIDE, SODIUM LACTATE AND CALCIUM CHLORIDE: 600; 310; 30; 20 INJECTION, SOLUTION INTRAVENOUS at 03:50

## 2023-02-27 RX ADMIN — ROPIVACAINE HYDROCHLORIDE 200 MG: 2 INJECTION, SOLUTION EPIDURAL; INFILTRATION at 04:31

## 2023-02-27 RX ADMIN — LABETALOL HYDROCHLORIDE 40 MG: 5 INJECTION, SOLUTION INTRAVENOUS at 13:23

## 2023-02-27 RX ADMIN — SODIUM CHLORIDE 100 ML: 900 INJECTION INTRAVENOUS at 11:28

## 2023-02-27 RX ADMIN — TRANEXAMIC ACID 1000 MG: 100 INJECTION, SOLUTION INTRAVENOUS at 11:24

## 2023-02-27 RX ADMIN — LOPERAMIDE HYDROCHLORIDE 2 MG: 2 CAPSULE ORAL at 15:47

## 2023-02-27 RX ADMIN — OXYTOCIN 20 UNITS: 10 INJECTION, SOLUTION INTRAMUSCULAR; INTRAVENOUS at 09:00

## 2023-02-27 RX ADMIN — CARBOPROST TROMETHAMINE 250 MCG: 250 INJECTION, SOLUTION INTRAMUSCULAR at 10:50

## 2023-02-27 RX ADMIN — SODIUM CHLORIDE, POTASSIUM CHLORIDE, SODIUM LACTATE AND CALCIUM CHLORIDE 1000 ML: 600; 310; 30; 20 INJECTION, SOLUTION INTRAVENOUS at 11:49

## 2023-02-27 RX ADMIN — SODIUM CHLORIDE, POTASSIUM CHLORIDE, SODIUM LACTATE AND CALCIUM CHLORIDE: 600; 310; 30; 20 INJECTION, SOLUTION INTRAVENOUS at 11:53

## 2023-02-27 RX ADMIN — MISOPROSTOL 800 MCG: 200 TABLET ORAL at 08:46

## 2023-02-27 RX ADMIN — BUPIVACAINE HYDROCHLORIDE 4 ML: 2.5 INJECTION, SOLUTION EPIDURAL; INFILTRATION; INTRACAUDAL; PERINEURAL at 04:18

## 2023-02-27 RX ADMIN — OXYTOCIN 125 ML/HR: 10 INJECTION, SOLUTION INTRAMUSCULAR; INTRAVENOUS at 09:01

## 2023-02-27 RX ADMIN — SODIUM CHLORIDE, POTASSIUM CHLORIDE, SODIUM LACTATE AND CALCIUM CHLORIDE: 600; 310; 30; 20 INJECTION, SOLUTION INTRAVENOUS at 04:40

## 2023-02-27 ASSESSMENT — PAIN SCALES - GENERAL: PAIN_LEVEL: 0

## 2023-02-27 ASSESSMENT — FIBROSIS 4 INDEX: FIB4 SCORE: 0.76

## 2023-02-27 ASSESSMENT — PAIN DESCRIPTION - PAIN TYPE: TYPE: ACUTE PAIN

## 2023-02-27 NOTE — PROGRESS NOTES
0300: pt to labor and delivery, pt c/o srom at 2345 this evening with uc's.  Efm and toco applied. Ss done.  Clear fluid noted in vagina.  Sve 5-6/100/-1.  Call to dr. Cabral, orders received.  Report to bonita rn

## 2023-02-27 NOTE — PROGRESS NOTES
Labor Progress Note    Lenore Beard   38w1d/COVID positive day 6      Subjective:  Pt comfortable with epidural. No PIH sx. Pt with h/o of PIH and HELLP syndrome with previous pregnancy.  Uterine contractions:yes  Pain: No    Objective:   Vitals:    02/27/23 0430 02/27/23 0435 02/27/23 0440 02/27/23 0450   BP: (!) 180/86 (!) 156/74 137/74 (!) 141/82   Pulse: 75 67 71 72   Resp:       Temp:       TempSrc:       SpO2:       Weight:       Height:         FHT: 150's category 1  [place]: q 2  SVE:7-8/100/+1, per nurse, gross srom, clear   Ext: tr edema, non-tender,  reflexes (none with epidural)  Membranes ruptured: .yes, 11:50 pm    Meds:   Epidural : .yes  Pitocin: .no    Labs:  Recent Results (from the past 24 hour(s))   Hold Blood Bank Specimen (Not Tested)    Collection Time: 02/27/23  3:30 AM   Result Value Ref Range    Holding Tube - Bb DONE    CBC with differential    Collection Time: 02/27/23  3:30 AM   Result Value Ref Range    WBC 10.1 4.8 - 10.8 K/uL    RBC 4.75 4.20 - 5.40 M/uL    Hemoglobin 11.0 (L) 12.0 - 16.0 g/dL    Hematocrit 35.8 (L) 37.0 - 47.0 %    MCV 75.4 (L) 81.4 - 97.8 fL    MCH 23.2 (L) 27.0 - 33.0 pg    MCHC 30.7 (L) 33.6 - 35.0 g/dL    RDW 48.5 35.9 - 50.0 fL    Platelet Count 180 164 - 446 K/uL    MPV 12.7 9.0 - 12.9 fL    Neutrophils-Polys 60.00 44.00 - 72.00 %    Lymphocytes 30.50 22.00 - 41.00 %    Monocytes 6.90 0.00 - 13.40 %    Eosinophils 1.10 0.00 - 6.90 %    Basophils 0.50 0.00 - 1.80 %    Immature Granulocytes 1.00 (H) 0.00 - 0.90 %    Nucleated RBC 0.00 /100 WBC    Neutrophils (Absolute) 6.07 2.00 - 7.15 K/uL    Lymphs (Absolute) 3.08 1.00 - 4.80 K/uL    Monos (Absolute) 0.70 0.00 - 0.85 K/uL    Eos (Absolute) 0.11 0.00 - 0.51 K/uL    Baso (Absolute) 0.05 0.00 - 0.12 K/uL    Immature Granulocytes (abs) 0.10 0.00 - 0.11 K/uL    NRBC (Absolute) 0.00 K/uL   T.PALLIDUM AB GENOVEVA (Syphilis)    Collection Time: 02/27/23  3:30 AM   Result Value Ref Range    Syphilis, Treponemal Qual  Non-Reactive Non-Reactive       Assessment:   38w1d/srom- pt admitted. Expt .   GBBS negative  Labile BP's-pending PIH labs. Pt without PIH sx.   H/O PIH/HELLP syndrome with previous pregnancy.  Day 6 of COVID positive- nasal congestion but no f/c.   Fetal status-reassuring        Leatha Cabral M.D.

## 2023-02-27 NOTE — ANESTHESIA PROCEDURE NOTES
Epidural Block    Date/Time: 2/27/2023 4:18 AM  Performed by: Scott Gaming M.D.  Authorized by: Scott Gaming M.D.     Patient Location:  OB  Start Time:  2/27/2023 4:18 AM  End Time:  2/27/2023 4:19 AM  Reason for Block: labor analgesia    patient identified, IV checked, site marked, risks and benefits discussed, surgical consent, monitors and equipment checked, pre-op evaluation and timeout performed    Patient Position:  Sitting  Prep: ChloraPrep, patient draped and sterile technique    Monitoring:  Blood pressure, continuous pulse oximetry and heart rate  Approach:  Midline  Location:  L3-L4  Injection Technique:  DASHA saline and DASHA air  Skin infiltration:  Lidocaine  Strength:  1%  Dose:  3ml  Needle Type:  Tuohy  Needle Gauge:  17 G  Needle Length:  3.5 in  Loss of resistance::  6.5  Catheter Size:  19 G  Catheter at Skin Depth:  12  Test Dose Result:  Negative   Single pass, easy DASHA with negative aspiration. Negative test dose and aspiration via catheter. Patient comfortable after bolus, no complications.

## 2023-02-27 NOTE — L&D DELIVERY NOTE
(dictated)    Short 2nd stage  Baby: female, APGARs 8-9, not weighed yet  Plac spont,  cc---pitocin, misoprostol 800 mcg rectally  No epis, small 2nd degree lac, 3-0 vicryl    Check CMP in AM (hx PP HELLP last pregnancy)

## 2023-02-27 NOTE — L&D DELIVERY NOTE
DATE OF SERVICE:  2023     PROCEDURES:    1.  Epidural anesthesia.  2.  Spontaneous vaginal delivery.  3.  Repair of second-degree perineal laceration.     OBSTETRICIAN:  Jigar Gastelum MD     DIAGNOSES:    1.  A 38 and 0/7 week gestation, delivered.  2.  Labor.  3.  Labile blood pressure.     COMPLICATIONS:  None.     ESTIMATED BLOOD LOSS:  600 mL     FINDINGS:  Baby--- female, 1 minute Apgar 8, 5 minute Apgar 9.  Weight 3450 grams     INDICATIONS:  This 32-year-old lady is now .  She presented at term with   spontaneously ruptured membranes and spontaneous labor.  She is group B strep   negative.  She had a recent positive COVID test, but she is completely   asymptomatic and afebrile.  She had labile blood pressure intrapartum.  Once   the blood pressure cuff was  moved from her leg to her arm, her blood   pressures were noted to be normal, 130/70s.  There was no laboratory evidence   of thrombocytopenia or hepatocellular necrosis.  Fetal monitoring was   reassuring throughout.  She received epidural anesthesia.  Second stage was   short.  She pushed the baby out occiput anterior in a controlled fashion with   no episiotomy.  I bulb suctioned the mouth.  I placed the baby on the   patient's chest and 4 minutes later, I doubly clamped and cut the cord.  The   intact placenta and all trailing membranes delivered spontaneously in a timely   fashion.  There was a 3-vessel cord with central insertion.  I repaired a   small midline second-degree perineal laceration with full thickness running   3-0 Vicryl.  Uterine atony resulted in EBL of 600.  I expressed clots   bimanually.  Presently, the uterus is firm.  We gave IV oxytocin and 800 mcg   of misoprostol rectally to assist with uterine tone and hemostasis.  Sponge   and needle counts were correct.     We will check chemistry panel and platelets tomorrow morning.  The patient has   a history of postpartum HELLP syndrome after her previous  delivery.        ______________________________  MD MONICA Mejias    DD:  02/27/2023 09:01  DT:  02/27/2023 09:22    Job#:  [id number]

## 2023-02-27 NOTE — PROGRESS NOTES
Late Entry    0630-Pt care assumed from Vicenta SLOAN.  Pt's last VE=7/100/-1 at 0400, the patient currently has no pain at this time with an epidural in place.  Hypertensive protocol ordered-previous high BPs.  0635-BP cuff moved from leg to L arm=145/75.  0755-Dr. Gastelum at BS, SVE=complete, practice push done-orders to start pushing.  Pt in radha, NATHALIA at BS.  0820-Dr. Gastelum called for delivery.  0832-Delivery of viable female, 8/9 apgars. Oxytocin running.  0838-Placenta delivered.  1005-clots and VB, FF, chucks weighed- 329mls noted.  1020-additional bleeding, FF after massage, blood koyuczps=207. 1037-Dr. Gastelum called and updated on pt bleeding. Orders for hemabate.  1050-Hemabate given. 1115-chucks measured =110-Dr. Gastelum called and updated, additional BP taken during phone maia=851/96-- Order for TXA and states he will be over to assess.  1135-Dr. Gastelum at BS, bimanual exam done-JMI=513, straight cath done.  1147-156/83, will continue to monitor.  POC-Fluid replacement and labs. Pt feeling better at this point.  1227-BA=597/100, 1247-LO=121/100 1300-20 mg of Labetalol given.  1323 -40 mg of Labetalol given, 1357 10 mg of NIfedipine. 1415-Pt up to BR voided, pericare, gown changed.  1510-BPs stable pt Pt transferred to S 230. 1720-BS report given to Esteban SLOAN-pt care assumed.

## 2023-02-27 NOTE — ANESTHESIA POSTPROCEDURE EVALUATION
Patient: Lenore Beard    Procedure Summary     Date: 02/27/23 Room / Location:     Anesthesia Start: 0412 Anesthesia Stop: 0832    Procedure: Labor Epidural Diagnosis:     Scheduled Providers:  Responsible Provider: Nilo Resendiz M.D.    Anesthesia Type: epidural ASA Status: 2          Final Anesthesia Type: epidural  Last vitals  BP   Blood Pressure: 131/78    Temp   37.6 °C (99.7 °F)    Pulse   66   Resp   18    SpO2   96 %      Anesthesia Post Evaluation    Patient location during evaluation: PACU  Patient participation: complete - patient participated  Level of consciousness: awake and alert  Pain score: 0    Airway patency: patent  Anesthetic complications: no  Cardiovascular status: hemodynamically stable  Respiratory status: acceptable  Hydration status: euvolemic    PONV: none          No notable events documented.

## 2023-02-27 NOTE — H&P
"DATE OF ADMISSION:  2023     CHIEF COMPLAINT:  \"My water broke at 11:50 p.m. and elevated blood pressures.     HISTORY OF PRESENT ILLNESS:  This patient is a 32-year-old  2, para 1   with a due date of 2023 who is 38 weeks and 1 day.  The patient has had   prenatal care with Dr. Gastelum.  She has a history of preeclampsia and HELLP   syndrome with her last pregnancy.  She has been on baby aspirin and doing   well except for the past 2 days.  The patient states that she has been having   high blood pressures at home, ranging from 160s over 90s and when she lies   down and rests they come down to 130s to 70s.  The patient also was diagnosed   with COVID 6 days ago.  The patient has had low-grade temperature, but has   been afebrile for the last 48 hours.  Her highest temperature was 100.2.  The   patient today denies any headache.  No blurry vision, not seeing any spots.    No right upper quadrant pain, no fever, no chills, no shortness of breath.    She only has nasal congestion.  The patient has been having good fetal   movement and was having regular painful contractions when she arrived, she was   dilated at that time to 5-6 cm and now is comfortable with an epidural.     PAST MEDICAL HISTORY:  1.  History of preeclampsia and HELLP syndrome with her last pregnancy,   delivered at 36 weeks.  2.  Iron deficiency anemia.  3.  GERD.  4.  Migraine headaches.  5.  Anxiety.  6.  HPV positive.  7. COVID 19 positive     PAST SURGICAL HISTORY:  Previous wisdom teeth extraction in .     MEDICATIONS:  She is on pantoprazole 20 mg 1 p.o. daily, ferrous sulfate 324   mg twice a day, vitamin C 500 mg with iron, prenatal vitamin and aspirin 81 mg   1 p.o. every day.     ALLERGIES:  AMOXICILLIN.     OBSTETRICAL HISTORY:  She is a  2, para 1, last pregnancy, delivered on   2020 spontaneous vaginal delivery at 36 weeks and 4 days, induced for   preeclampsia.  The patient was readmitted for HELLP " syndrome and was in the   hospital for 3 more days in [place], [place].  The patient with regular   menstrual cycles, started menstruating at age 15, has menstrual cycles every   25-28 days, lasts 7 days.  Last menstrual cycle of 2022.  Has a history   of HPV.  No HSV.  No chlamydia or gonorrhea.     SOCIAL HISTORY:  The patient is .  She denies tobacco, alcohol or drug   use.     PHYSICAL EXAMINATION:  VITAL SIGNS:  Labile blood pressures ranging from 132//86.  GENERAL:  Pleasant female, comfortable with an epidural.  ABDOMEN:  Soft, gravid.  Leopold's 7 pounds.  PELVIC:  Fetal heart tones 150s, category 1.  [place], rogelio every 2   minutes.  Sterile vaginal exam 7-8 cm dilated, 100% effaced, +1 station per   nurse with gross rupture of membranes, clear.  EXTREMITIES:  Trace edema, nontender.  Reflexes not elicited secondary to   epidural.     LABORATORY DATA:  Pending PIH labs.  Current H and H 11.0 and 35.8 and   platelets are 180.  RPR nonreactive.  Group B strep is negative.  One-hour   glucose 95.  Hepatitis B surface antigen is nonreactive, HIV nonreactive,   rubella immune, hepatitis C nonreactive, RPR nonreactive.  A positive antibody   screen negative.  Chlamydia and gonorrhea negative.     ASSESSMENT AND PLAN:  A 32-year-old  2, para 1 at 38 weeks and 1 day by   dates and ultrasound with a due date of 2023.  1.  Active labor/spontaneous rupture of membranes.  The patient has been   admitted.  She is comfortable with an epidural and having regular   contractions.  We will expect a normal spontaneous vaginal delivery.  2.  GBS negative.  3.  Labile blood pressures.  The patient with no preeclampsia symptoms.  PIH   labs are pending including a protein creatinine ratio. Will start hypertensive protocol as needed.Consider magnesium sulfate for PIH sx or severe range blood pressures.   4.  History of PIH and HELLP syndrome with previous pregnancy.  5.  Day 6 of COVID  positive.  The patient only with nasal congestion.  No   fever, no chills, no shortness of breath.  The patient was not treated with   Paxlovid.  6.  Fetal status is reassuring.           ______________________________  JEFFREY JHA MD    [facility]    DD:  02/27/2023 05:47  DT:  02/27/2023 06:38    Job#:  [id number]    CC:ARMIDA MILLER MD

## 2023-02-27 NOTE — PROGRESS NOTES
3 hrs PP    Pt. Has received: IV pitocin, misoprostol 800 mcg rectally, PGF2a 250 mcg, TXA 1 gram IV.    Called for PPH, RN weighed 550 grams of blood on pads and I did bimanual clot expression, expressed 274 gram clot.  Now uterus firm and 4 cm below umbilicus.    Straight-cathed for several hundred cc clear urine.    BP 150s/80s, pulse 80s, O2 sat 94% on room air. Alert, O x 3.    Total EBL 1400 so far.    PLAN:  2 liters crystalloid over next 1.4 hrs.  STAT CBC, coags, COD.    Pt. Has no Sabianist objections to transfusion if needed.    Discussed potential further interventions if needed:  more PGF2a, transfusions, Bakri balloon, laparotomy with compression sutures, hysterectomy.  She states she's done childbearing.

## 2023-02-27 NOTE — ANESTHESIA TIME REPORT
Anesthesia Start and Stop Event Times     Date Time Event    2/27/2023 0412 Ready for Procedure     0412 Anesthesia Start     0832 Anesthesia Stop        Responsible Staff  02/27/23    Name Role Begin End    Scott Gaming M.D. Anesth 0412 0711    Nilo Resendiz M.D. Anesth 0711 0832        Overtime Reason:  no overtime (within assigned shift)    Comments:

## 2023-02-27 NOTE — ANESTHESIA PREPROCEDURE EVALUATION
Date: 02/27/23  Procedure: Labor Epidural     Patient requests Neuraxial Labor Analgesia.     Anesthesia: No problems with Anesthesia.  Resp: No asthma or COPD history.  Cards: No pre-eclampsia, or known cardiac abnormalities.  Heme: No known bleeding disorders, platelets reviewed.     Risks of procedure discussed including: infection, bleeding, nerve damage, and post-dural puncture headache.     Relevant Problems   No relevant active problems       Physical Exam    Airway   Mallampati: II  TM distance: >3 FB  Neck ROM: full       Cardiovascular - normal exam  Rhythm: regular  Rate: normal  (-) murmur     Dental - normal exam           Pulmonary - normal exam  Breath sounds clear to auscultation     Abdominal    Neurological - normal exam                 Anesthesia Plan    ASA 2       Plan - epidural   Neuraxial block will be labor analgesia                  Pertinent diagnostic labs and testing reviewed    Informed Consent:    Anesthetic plan and risks discussed with patient.

## 2023-02-28 PROBLEM — O13.9 GESTATIONAL HYPERTENSION: Status: ACTIVE | Noted: 2023-02-28

## 2023-02-28 PROBLEM — D62 ANEMIA ASSOCIATED WITH ACUTE BLOOD LOSS: Status: ACTIVE | Noted: 2023-02-28

## 2023-02-28 LAB
ALBUMIN SERPL BCP-MCNC: 3 G/DL (ref 3.2–4.9)
ALBUMIN/GLOB SERPL: 1.1 G/DL
ALP SERPL-CCNC: 197 U/L (ref 30–99)
ALT SERPL-CCNC: 28 U/L (ref 2–50)
ANION GAP SERPL CALC-SCNC: 8 MMOL/L (ref 7–16)
AST SERPL-CCNC: 35 U/L (ref 12–45)
BILIRUB SERPL-MCNC: <0.2 MG/DL (ref 0.1–1.5)
BUN SERPL-MCNC: 9 MG/DL (ref 8–22)
CALCIUM ALBUM COR SERPL-MCNC: 9.5 MG/DL (ref 8.5–10.5)
CALCIUM SERPL-MCNC: 8.7 MG/DL (ref 8.5–10.5)
CHLORIDE SERPL-SCNC: 106 MMOL/L (ref 96–112)
CO2 SERPL-SCNC: 23 MMOL/L (ref 20–33)
CREAT SERPL-MCNC: 0.65 MG/DL (ref 0.5–1.4)
ERYTHROCYTE [DISTWIDTH] IN BLOOD BY AUTOMATED COUNT: 49.3 FL (ref 35.9–50)
GFR SERPLBLD CREATININE-BSD FMLA CKD-EPI: 121 ML/MIN/1.73 M 2
GLOBULIN SER CALC-MCNC: 2.7 G/DL (ref 1.9–3.5)
GLUCOSE SERPL-MCNC: 87 MG/DL (ref 65–99)
HCT VFR BLD AUTO: 25 % (ref 37–47)
HGB BLD-MCNC: 7.5 G/DL (ref 12–16)
MCH RBC QN AUTO: 22.9 PG (ref 27–33)
MCHC RBC AUTO-ENTMCNC: 30 G/DL (ref 33.6–35)
MCV RBC AUTO: 76.5 FL (ref 81.4–97.8)
PLATELET # BLD AUTO: 147 K/UL (ref 164–446)
PMV BLD AUTO: 12 FL (ref 9–12.9)
POTASSIUM SERPL-SCNC: 4.4 MMOL/L (ref 3.6–5.5)
PROT SERPL-MCNC: 5.7 G/DL (ref 6–8.2)
RBC # BLD AUTO: 3.27 M/UL (ref 4.2–5.4)
SODIUM SERPL-SCNC: 137 MMOL/L (ref 135–145)
WBC # BLD AUTO: 9.5 K/UL (ref 4.8–10.8)

## 2023-02-28 PROCEDURE — 700102 HCHG RX REV CODE 250 W/ 637 OVERRIDE(OP): Performed by: OBSTETRICS & GYNECOLOGY

## 2023-02-28 PROCEDURE — 85027 COMPLETE CBC AUTOMATED: CPT

## 2023-02-28 PROCEDURE — A9270 NON-COVERED ITEM OR SERVICE: HCPCS | Performed by: OBSTETRICS & GYNECOLOGY

## 2023-02-28 PROCEDURE — 80053 COMPREHEN METABOLIC PANEL: CPT

## 2023-02-28 PROCEDURE — 36415 COLL VENOUS BLD VENIPUNCTURE: CPT

## 2023-02-28 PROCEDURE — 770002 HCHG ROOM/CARE - OB PRIVATE (112)

## 2023-02-28 RX ORDER — FERROUS SULFATE 325(65) MG
325 TABLET ORAL 2 TIMES DAILY WITH MEALS
Status: DISCONTINUED | OUTPATIENT
Start: 2023-02-28 | End: 2023-03-01 | Stop reason: HOSPADM

## 2023-02-28 RX ORDER — ASCORBIC ACID 500 MG
500 TABLET ORAL 2 TIMES DAILY WITH MEALS
Status: DISCONTINUED | OUTPATIENT
Start: 2023-02-28 | End: 2023-03-01 | Stop reason: HOSPADM

## 2023-02-28 RX ADMIN — IBUPROFEN 800 MG: 800 TABLET, FILM COATED ORAL at 05:10

## 2023-02-28 RX ADMIN — NIFEDIPINE 30 MG: 30 TABLET, FILM COATED, EXTENDED RELEASE ORAL at 17:21

## 2023-02-28 RX ADMIN — IBUPROFEN 800 MG: 800 TABLET, FILM COATED ORAL at 16:21

## 2023-02-28 RX ADMIN — FERROUS SULFATE TAB 325 MG (65 MG ELEMENTAL FE) 325 MG: 325 (65 FE) TAB at 08:25

## 2023-02-28 RX ADMIN — PRENATAL WITH FERROUS FUM AND FOLIC ACID 1 TABLET: 3080; 920; 120; 400; 22; 1.84; 3; 20; 10; 1; 12; 200; 27; 25; 2 TABLET ORAL at 08:25

## 2023-02-28 RX ADMIN — ACETAMINOPHEN 1000 MG: 500 TABLET, FILM COATED ORAL at 21:16

## 2023-02-28 RX ADMIN — ACETAMINOPHEN 1000 MG: 500 TABLET, FILM COATED ORAL at 12:18

## 2023-02-28 RX ADMIN — OXYCODONE HYDROCHLORIDE AND ACETAMINOPHEN 500 MG: 500 TABLET ORAL at 17:21

## 2023-02-28 RX ADMIN — OXYCODONE HYDROCHLORIDE AND ACETAMINOPHEN 500 MG: 500 TABLET ORAL at 08:25

## 2023-02-28 RX ADMIN — FERROUS SULFATE TAB 325 MG (65 MG ELEMENTAL FE) 325 MG: 325 (65 FE) TAB at 17:21

## 2023-02-28 ASSESSMENT — EDINBURGH POSTNATAL DEPRESSION SCALE (EPDS)
I HAVE BLAMED MYSELF UNNECESSARILY WHEN THINGS WENT WRONG: NOT VERY OFTEN
I HAVE BEEN SO UNHAPPY THAT I HAVE BEEN CRYING: NO, NEVER
THE THOUGHT OF HARMING MYSELF HAS OCCURRED TO ME: NEVER
I HAVE BEEN ANXIOUS OR WORRIED FOR NO GOOD REASON: YES, VERY OFTEN
I HAVE FELT SCARED OR PANICKY FOR NO GOOD REASON: YES, SOMETIMES
I HAVE FELT SAD OR MISERABLE: NO, NOT AT ALL
I HAVE BEEN SO UNHAPPY THAT I HAVE HAD DIFFICULTY SLEEPING: NOT AT ALL
I HAVE LOOKED FORWARD WITH ENJOYMENT TO THINGS: AS MUCH AS I EVER DID
THINGS HAVE BEEN GETTING ON TOP OF ME: NO, MOST OF THE TIME I HAVE COPED QUITE WELL
I HAVE BEEN ABLE TO LAUGH AND SEE THE FUNNY SIDE OF THINGS: AS MUCH AS I ALWAYS COULD

## 2023-02-28 ASSESSMENT — PAIN DESCRIPTION - PAIN TYPE
TYPE: ACUTE PAIN

## 2023-02-28 NOTE — PROGRESS NOTES
1900- Report received from Esteban GOSS RN. POC discussed.    1930- Pt assessment. Pt breastfeeding. Denies assistance. Awaiting room on PPU.    2150- Pt and infant transferred to PPU via WC, by NATHALIA Nunez,accompanied by FOB, without incident. Bands and cuddles verified. POC discussed.

## 2023-02-28 NOTE — LACTATION NOTE
"This note was copied from a baby's chart.  Initial Consult:      at 38+0weeks.  Pregnancy complicatd with PIH.  Delivery complicated with PPH.      Breastfeeding hx:  /pumped first child for 3mos.     MOB states baby has been feeding approx every 3hrs, difficult latch on left side.  Has been providing pacifier occasionally.    Tongue tip notch noted while baby awake and rooting.  Baby with poor tongue lift, but adequate extension and lateral movement.  Able to maintain suck on finger.  Discussed possible tongue tie with parents and will follow up with pediatrician.     With permission, assisted with latch on left breast.  Minor adjustments made to body positioning.  Baby deep latched immediately with rhythmic suck pattern and audible swallowing. MOB expressed slight pinch but transitioned into \"tugging\" feeling within 30sec.  Baby delatched at approx 10min, nipple round.  Taught football hold and MOB able to independently latch.  Baby remained latch duration of consult.      Basic breastfeeding information education given to include feeding baby with feeding cues and at least a minimum of 8x/24 hours.  It is not recommended to let baby sleep longer than 4 hours between feedings and if sleepy, place skin to skin to promote feeding interest and milk production.   Expect cluster feeding as this is normal during early days of life and growth spurts. Discussed recognition of early feeding cues and importance of deep latch. It is not recommended to use pacifiers or bottle supplementation with formula until breast feeding and milk production is well established or at least 2-4 weeks.     Expect breast changes as breastmilk increases in volume.  Frequent feedings as well as looking for transitioning stools from dark meconium to bright yellow/green seedy loose stools by day 5.     [facility] Breastfeeding Resources given to MOB      Referral sent to [facility]    "

## 2023-02-28 NOTE — DISCHARGE PLANNING
Discharge Planning Assessment Post Partum    Reason for Referral: History of anxiety  Address: [facility] [place] [address]  Phone: [phone]  Type of Living Situation: living with FOB and son  Mom Diagnosis: Pregnancy  Baby Diagnosis: -38.1 weeks  Primary Language: English    Father of the Baby: Justino Beard  Involved in baby’s care? Yes  Contact Information: [phone]    Prenatal Care: Yes-Dr. Gastelum  Mom's PCP: No PCP listed  PCP for new baby: Dr. 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  MOB scored a 7 on the EPDS screen.  Discussed with mother who has a prescription for an antidepressant that she can take after delivery  Domestic Violence History: No  Drug/ETOH History: No    Resources Provided: Offered resources, however parents are well-prepared for infant and deny any needs  Referrals Made: None     Clearance for Discharge: Infant is cleared to discharge home with parents once medically cleared

## 2023-02-28 NOTE — CARE PLAN
Problem: Knowledge Deficit - Postpartum  Goal: Patient will verbalize and demonstrate understanding of self and infant care  Outcome: Progressing     Problem: Psychosocial - Postpartum  Goal: Patient will verbalize and demonstrate effective bonding and parenting behavior  Outcome: Progressing   The patient is Stable - Low risk of patient condition declining or worsening         Progress made toward(s) clinical / shift goals:  Pt demonstrates understanding of self and infant care. Pt breastfeeding Q 2-3 hours. FOB and infant at the bedside.     Patient is not progressing towards the following goals:

## 2023-02-28 NOTE — PROGRESS NOTES
POSTPARTUM DAY 1    No complaints.  Patient is doing well with infant care and lactation issues.  Patient is voiding well.    PE:    Afebrile  BP 130s-140s/70s-low 90s on nifedipine SR 30 mg/day (given at 19:38 last night)    Uterus involuting appropriately, nontender  Perineum:  No perineal complaints, so I didn't do specific perineal exam.  Calves nontender, Jessy negative bilaterally.     LAB:       02/27/23 03:30 02/27/23 12:35 02/28/23 05:52   WBC 10.1 11.9 (H) 9.5   Hemoglobin 11.0 (L) 8.9 (L) 7.5 (L)   Hematocrit 35.8 (L) 28.8 (L) 25.0 (L)   Platelet Count 180 141 (L) 147 (L)      02/28/23 05:52   Bun 9   Creatinine 0.65      AST(SGOT) 35   ALT(SGPT) 28   Total Bilirubin <0.2          PLAN:    PNV daily, FeSO4 325 mg+Vitamin C 500 mg BID x 3 weeks.  Nifedipine SR 30 mg/day. If any SBP>150 and/or DBP>100 increase to 60 mg/day.    Postpartum care.  Lactation consult.       Check liver enzymes again tomorrow.  Stay til tomorrow (pt had PP HELLP last pregnancy).

## 2023-02-28 NOTE — PROGRESS NOTES
10 hrs PP    No recurrent heavy bleeding.  Hypertension 4 hrs ago improved with IV labetalol then IR nifedipine 10 mg x1.   Presently BP 130s/80s, pt feels fine, denies headache.    PLAN: Nifedipine SR 30 mg PO Q24 hrs.

## 2023-02-28 NOTE — PROGRESS NOTES
Pt admitted to S329 via wheelchair. Bedside report received. Assessment done, poc and rounding discussed. Pt and FOB oriented to room and educated on use of call light, emergency light, safe sleep, use of bulb syringe and infant I/O clipboard. Feeding plan discussed. Questions answered.

## 2023-02-28 NOTE — PROGRESS NOTES
1720-Report received, care assumed. POC dicussed with pt. Pt denies any needs at this time.  1815-Dr Gastelum at bedside. POC dicussed. PO BP med order received, per MD pt can go to PP. Pt denies any questions at this time  1900-Report given to Ashlee SLOAN

## 2023-03-01 ENCOUNTER — PHARMACY VISIT (OUTPATIENT)
Dept: PHARMACY | Facility: MEDICAL CENTER | Age: 33
End: 2023-03-01
Payer: COMMERCIAL

## 2023-03-01 VITALS
HEIGHT: 69 IN | DIASTOLIC BLOOD PRESSURE: 78 MMHG | WEIGHT: 194 LBS | TEMPERATURE: 97.5 F | BODY MASS INDEX: 28.73 KG/M2 | RESPIRATION RATE: 16 BRPM | OXYGEN SATURATION: 97 % | HEART RATE: 84 BPM | SYSTOLIC BLOOD PRESSURE: 118 MMHG

## 2023-03-01 LAB
ALBUMIN SERPL BCP-MCNC: 3.1 G/DL (ref 3.2–4.9)
ALBUMIN/GLOB SERPL: 1.1 G/DL
ALP SERPL-CCNC: 181 U/L (ref 30–99)
ALT SERPL-CCNC: 25 U/L (ref 2–50)
ANION GAP SERPL CALC-SCNC: 8 MMOL/L (ref 7–16)
AST SERPL-CCNC: 27 U/L (ref 12–45)
BILIRUB SERPL-MCNC: <0.2 MG/DL (ref 0.1–1.5)
BUN SERPL-MCNC: 9 MG/DL (ref 8–22)
CALCIUM ALBUM COR SERPL-MCNC: 9.1 MG/DL (ref 8.5–10.5)
CALCIUM SERPL-MCNC: 8.4 MG/DL (ref 8.5–10.5)
CHLORIDE SERPL-SCNC: 107 MMOL/L (ref 96–112)
CO2 SERPL-SCNC: 24 MMOL/L (ref 20–33)
CREAT SERPL-MCNC: 0.62 MG/DL (ref 0.5–1.4)
ERYTHROCYTE [DISTWIDTH] IN BLOOD BY AUTOMATED COUNT: 52 FL (ref 35.9–50)
GFR SERPLBLD CREATININE-BSD FMLA CKD-EPI: 122 ML/MIN/1.73 M 2
GLOBULIN SER CALC-MCNC: 2.7 G/DL (ref 1.9–3.5)
GLUCOSE SERPL-MCNC: 76 MG/DL (ref 65–99)
HCT VFR BLD AUTO: 23.7 % (ref 37–47)
HGB BLD-MCNC: 7.1 G/DL (ref 12–16)
MCH RBC QN AUTO: 23.7 PG (ref 27–33)
MCHC RBC AUTO-ENTMCNC: 30 G/DL (ref 33.6–35)
MCV RBC AUTO: 79 FL (ref 81.4–97.8)
PLATELET # BLD AUTO: 120 K/UL (ref 164–446)
PMV BLD AUTO: 11.1 FL (ref 9–12.9)
POTASSIUM SERPL-SCNC: 4.6 MMOL/L (ref 3.6–5.5)
PROT SERPL-MCNC: 5.8 G/DL (ref 6–8.2)
RBC # BLD AUTO: 3 M/UL (ref 4.2–5.4)
SODIUM SERPL-SCNC: 139 MMOL/L (ref 135–145)
WBC # BLD AUTO: 7.9 K/UL (ref 4.8–10.8)

## 2023-03-01 PROCEDURE — A9270 NON-COVERED ITEM OR SERVICE: HCPCS | Performed by: OBSTETRICS & GYNECOLOGY

## 2023-03-01 PROCEDURE — 80053 COMPREHEN METABOLIC PANEL: CPT

## 2023-03-01 PROCEDURE — RXMED WILLOW AMBULATORY MEDICATION CHARGE: Performed by: OBSTETRICS & GYNECOLOGY

## 2023-03-01 PROCEDURE — 700102 HCHG RX REV CODE 250 W/ 637 OVERRIDE(OP): Performed by: OBSTETRICS & GYNECOLOGY

## 2023-03-01 PROCEDURE — 36415 COLL VENOUS BLD VENIPUNCTURE: CPT

## 2023-03-01 PROCEDURE — 85027 COMPLETE CBC AUTOMATED: CPT

## 2023-03-01 RX ORDER — ACETAMINOPHEN 500 MG
500-1000 TABLET ORAL EVERY 6 HOURS PRN
Qty: 30 TABLET | Refills: 0 | COMMUNITY
Start: 2023-03-01

## 2023-03-01 RX ORDER — SERTRALINE HYDROCHLORIDE 25 MG/1
25 TABLET, FILM COATED ORAL DAILY
Qty: 30 TABLET | Refills: 11 | Status: SHIPPED
Start: 2023-03-01

## 2023-03-01 RX ORDER — FERROUS SULFATE 325(65) MG
325 TABLET ORAL 2 TIMES DAILY WITH MEALS
Qty: 40 TABLET | Refills: 0 | Status: SHIPPED | OUTPATIENT
Start: 2023-03-01 | End: 2023-03-21

## 2023-03-01 RX ORDER — NIFEDIPINE 30 MG/1
30 TABLET, EXTENDED RELEASE ORAL DAILY
Qty: 30 TABLET | Refills: 1 | Status: SHIPPED | OUTPATIENT
Start: 2023-03-01 | End: 2023-03-31

## 2023-03-01 RX ORDER — ASCORBIC ACID 500 MG
500 TABLET ORAL 2 TIMES DAILY WITH MEALS
Qty: 40 TABLET | Refills: 0 | Status: SHIPPED | OUTPATIENT
Start: 2023-03-01 | End: 2023-03-21

## 2023-03-01 RX ORDER — IBUPROFEN 200 MG
200-600 TABLET ORAL EVERY 6 HOURS PRN
COMMUNITY
Start: 2023-03-01

## 2023-03-01 RX ADMIN — FERROUS SULFATE TAB 325 MG (65 MG ELEMENTAL FE) 325 MG: 325 (65 FE) TAB at 08:04

## 2023-03-01 RX ADMIN — PRENATAL WITH FERROUS FUM AND FOLIC ACID 1 TABLET: 3080; 920; 120; 400; 22; 1.84; 3; 20; 10; 1; 12; 200; 27; 25; 2 TABLET ORAL at 08:04

## 2023-03-01 RX ADMIN — IBUPROFEN 800 MG: 800 TABLET, FILM COATED ORAL at 06:20

## 2023-03-01 RX ADMIN — OXYCODONE HYDROCHLORIDE AND ACETAMINOPHEN 500 MG: 500 TABLET ORAL at 08:04

## 2023-03-01 ASSESSMENT — PAIN DESCRIPTION - PAIN TYPE: TYPE: ACUTE PAIN

## 2023-03-01 NOTE — LACTATION NOTE
This note was copied from a baby's chart.  Follow up lactation support : Mom reports feedings have been going well. Mom breast fed her first child for about 3 months then continued to pump for 9 more months providing breast milk  Taught proper latch and positioning with mom and baby on left side in FB hold. Mom reports that this position feels better on tender nipples. LC taught nipple care.  Baby latched easily, had audible swallows and was engaged in feeding for about 10 minutes. LC encouraged mother to offer both breast every feeding.   Mom has a pump at home. Mom stated she has [employer] with her last baby and experienced PMAD.  LC referred mother to the [employer]-[email or web address] web site.   OB has given mom a prescription if needed for post partum anxiety.   Referral was [facility] to [facility] for follow up with breastfeeding. Mom verbalizes understanding of feeding plan. FOB supportive.  Breastfeeding plan:    Feed baby with feeding cues and at least a minimum of 8x/24 hours.  Expect cluster feeding as this is normal during early days of life   It is not recommended to let baby sleep longer than 3 hours between feedings and if sleepy, place skin to skin to promote feeding interest and milk production.  Baby's usually feed more frequently and longer when skin to skin with mother. It is not recommended to use pacifiers or supplementing with formula until breast feeding and milk production is well established or at least 3-4 weeks.    Make sure infant is getting enough by ensuring frequent feedings as well as looking for transitioning stools from dark meconium to bright yellow/green seedy loose stools by day 5.     Follow up with PEDS PCP as scheduled for weight checks and to make sure feeding is progressing appropriately.     Call for breastfeeding support as needed through the  [facility] (see information sheet) as needed and attend the [facility] without need for appointment.

## 2023-03-01 NOTE — CARE PLAN
Problem: Psychosocial - Postpartum  Goal: Patient will verbalize and demonstrate effective bonding and parenting behavior  Outcome: Progressing     Problem: Altered Physiologic Condition  Goal: Patient physiologically stable as evidenced by normal lochia, palpable uterine involution and vitals within normal limits  Outcome: Progressing   The patient is Stable - Low risk of patient condition declining or worsening    Shift Goals  Clinical Goals: pain control    Progress made toward(s) clinical / shift goals:  pt lochia has been WDL. Pt has been caring for baby, breastfeeding on demand, changing diapers, and holding baby.     Patient is not progressing towards the following goals:

## 2023-03-01 NOTE — PROGRESS NOTES
Pt sleeping at time of vital signs to be taken.  asked that pt not be woken up.  Vital signs to be retaken at 2000.

## 2023-03-01 NOTE — PROGRESS NOTES
POSTPARTUM DAY 2    No complaints.  Patient is doing well with infant care and lactation issues.  Patient is voiding well.    PE:    Afebrile  BP 120s-140s/80s-low 90s on nifedipine SR 30 mg/day    Uterus involuting appropriately, nontender  Perineum:  No perineal complaints, so I didn't do specific perineal exam.  Calves nontender, Jessy negative bilaterally.     LAB:       02/27/23 03:30 02/28/23 05:52 03/01/23 07:10   WBC 10.1 9.5 7.9   Hemoglobin 11.0 (L) 7.5 (L) 7.1 (L)   Hematocrit 35.8 (L) 25.0 (L) 23.7 (L)   Platelet Count 180 147 (L) 120 (L)   Bun 13 9 9   Creatinine 0.66 0.65 0.62   GFR  120 121 122   AST(SGOT) 34 35 27   ALT(SGPT) 33 28 25   Total Bilirubin  <0.2 <0.2        PLAN:  Postpartum care.  Lactation consult.  Patient desires discharge:  today  .    Prescriptions:   PNV, Fe_VitC BID x 3 wks, nifedipine SR 30 mg/day, sertraline 25 mg/day. .  Followup plans:   2 wks.  Check BP 2-3x/day, hold nifedipine if SBP<120, increase to 60 mg/d if SBP>150 or DBP>95. .

## 2023-03-01 NOTE — PROGRESS NOTES
"1900- Report received from NATHALIA Evangelista.  Patient reported \"slight headache\".  Patient denied blurred vision, epigastric pain, and nausea.  Patient breastfeeding infant independently.  Patient denied needs at this time.  2012- Report given to NATHALIA Albarran, who assumed care of patient.  "

## 2023-03-04 NOTE — DISCHARGE SUMMARY
Discharge Summary:      Lenore Beard    Admit Date:   2023  Discharge Date:  3/1/2023     Admitting diagnosis:  Indication for care in labor or delivery [O75.9]  Discharge Diagnosis: Status post vaginal, spontaneous.  Pregnancy Complications: gestational hypertension  Tubal Ligation:  no        History:  No past medical history on file.  OB History    Para Term  AB Living   2 2 1 1   2   SAB IAB Ectopic Molar Multiple Live Births           0 1      # Outcome Date GA Lbr Kvng/2nd Weight Sex Delivery Anes PTL Lv   2 Term 23 38w0d 08:10 / 00:37 3.45 kg (7 lb 9.7 oz) F Vag-Spont EPI N TONIE   1                  Amoxicillin and Penicillins  Patient Active Problem List    Diagnosis Date Noted    Postpartum hypertension 2023    Postpartum depression 2023    Gestational hypertension 2023    Postpartum hemorrhage 2023    Anemia associated with acute blood loss 2023    Indication for care in labor or delivery 2023        Hospital Course:   32 y.o. , now para 2, was admitted with the above mentioned diagnosis, underwent Active Labor, vaginal, spontaneous. Patient postpartum course was unremarkable, with progressive advancement in diet , ambulation and toleration of oral analgesia. Patient without complaints today and desires discharge.      Vitals:    23 2110 23 0200 23 0600 23 1000   BP: 139/88 (!) 149/82 123/81 118/78   Pulse: 81 78 73 84   Resp: 16 16 17 16   Temp: 36.3 °C (97.3 °F) 36.1 °C (97 °F) 36.1 °C (97 °F) 36.4 °C (97.5 °F)   TempSrc: Temporal Temporal Temporal Temporal   SpO2: 96% 98% 97% 97%   Weight:       Height:           No current facility-administered medications for this encounter.     Current Outpatient Medications   Medication    acetaminophen (TYLENOL) 500 MG Tab    ascorbic acid (VITAMIN C) 500 MG tablet    ferrous sulfate 325 (65 Fe) MG tablet    ibuprofen (MOTRIN) 200 MG Tab    NIFEdipine SR  (PROCARDIA-XL) 30 MG tablet    sertraline (ZOLOFT) 25 MG tablet    albuterol 108 (90 Base) MCG/ACT Aero Soln inhalation aerosol       Exam:  Breast Exam: Inspection negative. No nipple discharge or bleeding. No masses or nodularity palpable  Abdomen: Abdomen soft, non-tender. BS normal. No masses,  No organomegaly  Fundus Non Tender: yes  Incision: none  Perineum: perineum intact  Extremity: extremities, peripheral pulses and reflexes normal     Labs:   02/27/23 03:30 02/28/23 05:52 03/01/23 07:10   WBC 10.1 9.5 7.9   Hemoglobin 11.0 (L) 7.5 (L) 7.1 (L)   Hematocrit 35.8 (L) 25.0 (L) 23.7 (L)   Platelet Count 180 147 (L) 120 (L)   Bun 13 9 9   Creatinine 0.66 0.65 0.62   GFR  120 121 122   AST(SGOT) 34 35 27   ALT(SGPT) 33 28 25   Total Bilirubin   <0.2 <0.2        Activity:   Discharge to home  Pelvic Rest x 6 weeks    Assessment:  Course complicated by postpartum hypertension responding nicely to nifedipine SR 30 mg/day;  Anemia secondary to blood loss, EBL 1400 cc  Discharge Assessment: Taking adequate diet and fluids     Follow up: Dr. Gastelum, 2 weeks  Discharge Meds:   Current Outpatient Medications   Medication Sig Dispense Refill    acetaminophen (TYLENOL) 500 MG Tab Take 1-2 Tablets by mouth every 6 hours as needed for Mild Pain or Moderate Pain. 30 Tablet 0    ascorbic acid (VITAMIN C) 500 MG tablet Take 1 Tablet by mouth 2 times a day with meals for 20 days. 40 Tablet 0    ferrous sulfate 325 (65 Fe) MG tablet Take 1 Tablet by mouth 2 times a day with meals for 20 days. 40 Tablet 0    ibuprofen (MOTRIN) 200 MG Tab Take 1-3 Tablets by mouth every 6 hours as needed for Mild Pain or Moderate Pain.      NIFEdipine SR (PROCARDIA-XL) 30 MG tablet Take 1 Tablet by mouth every day for 30 days. 30 Tablet 1    sertraline (ZOLOFT) 25 MG tablet Take 1 Tablet by mouth every day. 30 Tablet 11    albuterol 108 (90 Base) MCG/ACT Aero Soln inhalation aerosol Inhale 2 Puffs every 6 hours as needed for Shortness of  Breath. 8.5 g 0       Jigar Gastelum M.D.

## 2023-03-15 ENCOUNTER — HOSPITAL ENCOUNTER (OUTPATIENT)
Facility: MEDICAL CENTER | Age: 33
End: 2023-03-15
Attending: OBSTETRICS & GYNECOLOGY
Payer: COMMERCIAL

## 2023-03-15 LAB
ALT SERPL-CCNC: 67 U/L (ref 2–50)
ANISOCYTOSIS BLD QL SMEAR: ABNORMAL
AST SERPL-CCNC: 40 U/L (ref 12–45)
BASOPHILS # BLD AUTO: 0.6 % (ref 0–1.8)
BASOPHILS # BLD: 0.03 K/UL (ref 0–0.12)
BURR CELLS BLD QL SMEAR: NORMAL
COMMENT 1642: NORMAL
EOSINOPHIL # BLD AUTO: 0.12 K/UL (ref 0–0.51)
EOSINOPHIL NFR BLD: 2.4 % (ref 0–6.9)
ERYTHROCYTE [DISTWIDTH] IN BLOOD BY AUTOMATED COUNT: 68.4 FL (ref 35.9–50)
HCT VFR BLD AUTO: 40.8 % (ref 37–47)
HGB BLD-MCNC: 12.2 G/DL (ref 12–16)
IMM GRANULOCYTES # BLD AUTO: 0.01 K/UL (ref 0–0.11)
IMM GRANULOCYTES NFR BLD AUTO: 0.2 % (ref 0–0.9)
LYMPHOCYTES # BLD AUTO: 1.75 K/UL (ref 1–4.8)
LYMPHOCYTES NFR BLD: 34.4 % (ref 22–41)
MCH RBC QN AUTO: 25.9 PG (ref 27–33)
MCHC RBC AUTO-ENTMCNC: 29.9 G/DL (ref 33.6–35)
MCV RBC AUTO: 86.6 FL (ref 81.4–97.8)
MICROCYTES BLD QL SMEAR: ABNORMAL
MONOCYTES # BLD AUTO: 0.33 K/UL (ref 0–0.85)
MONOCYTES NFR BLD AUTO: 6.5 % (ref 0–13.4)
MORPHOLOGY BLD-IMP: NORMAL
NEUTROPHILS # BLD AUTO: 2.85 K/UL (ref 2–7.15)
NEUTROPHILS NFR BLD: 55.9 % (ref 44–72)
NRBC # BLD AUTO: 0 K/UL
NRBC BLD-RTO: 0 /100 WBC
OVALOCYTES BLD QL SMEAR: NORMAL
PLATELET # BLD AUTO: 265 K/UL (ref 164–446)
PLATELET BLD QL SMEAR: NORMAL
PMV BLD AUTO: 10.3 FL (ref 9–12.9)
POIKILOCYTOSIS BLD QL SMEAR: NORMAL
RBC # BLD AUTO: 4.71 M/UL (ref 4.2–5.4)
RBC BLD AUTO: PRESENT
WBC # BLD AUTO: 5.1 K/UL (ref 4.8–10.8)

## 2023-03-15 PROCEDURE — 36415 COLL VENOUS BLD VENIPUNCTURE: CPT

## 2023-03-15 PROCEDURE — 84450 TRANSFERASE (AST) (SGOT): CPT

## 2023-03-15 PROCEDURE — 84460 ALANINE AMINO (ALT) (SGPT): CPT

## 2023-03-15 PROCEDURE — 85025 COMPLETE CBC W/AUTO DIFF WBC: CPT

## 2023-04-20 ENCOUNTER — HOSPITAL ENCOUNTER (OUTPATIENT)
Dept: LAB | Facility: MEDICAL CENTER | Age: 33
End: 2023-04-20
Attending: OBSTETRICS & GYNECOLOGY

## 2023-04-20 ENCOUNTER — HOSPITAL ENCOUNTER (OUTPATIENT)
Facility: MEDICAL CENTER | Age: 33
End: 2023-04-20
Attending: OBSTETRICS & GYNECOLOGY
Payer: COMMERCIAL

## 2023-04-20 ENCOUNTER — HOSPITAL ENCOUNTER (OUTPATIENT)
Dept: LAB | Facility: MEDICAL CENTER | Age: 33
End: 2023-04-20
Attending: OBSTETRICS & GYNECOLOGY
Payer: COMMERCIAL

## 2023-04-20 LAB — ALT SERPL-CCNC: 52 U/L (ref 2–50)

## 2023-04-20 PROCEDURE — 84460 ALANINE AMINO (ALT) (SGPT): CPT

## 2023-04-20 PROCEDURE — 36415 COLL VENOUS BLD VENIPUNCTURE: CPT

## 2023-04-20 PROCEDURE — 88175 CYTOPATH C/V AUTO FLUID REDO: CPT

## 2023-04-21 LAB — CYTOLOGY REG CYTOL: NORMAL

## 2023-05-12 ENCOUNTER — HOSPITAL ENCOUNTER (OUTPATIENT)
Facility: MEDICAL CENTER | Age: 33
End: 2023-05-12
Attending: OBSTETRICS & GYNECOLOGY
Payer: COMMERCIAL

## 2023-05-12 LAB — ALT SERPL-CCNC: 37 U/L (ref 2–50)

## 2023-05-12 PROCEDURE — 36415 COLL VENOUS BLD VENIPUNCTURE: CPT

## 2023-05-12 PROCEDURE — 84460 ALANINE AMINO (ALT) (SGPT): CPT

## 2024-04-24 ENCOUNTER — HOSPITAL ENCOUNTER (OUTPATIENT)
Facility: MEDICAL CENTER | Age: 34
End: 2024-04-24
Attending: OBSTETRICS & GYNECOLOGY
Payer: COMMERCIAL

## 2024-04-24 PROCEDURE — 88175 CYTOPATH C/V AUTO FLUID REDO: CPT

## 2024-04-29 LAB
COMMENT NL11729A: NORMAL
CYTOLOGIST CVX/VAG CYTO: NORMAL
CYTOLOGY CVX/VAG DOC CYTO: NORMAL
CYTOLOGY CVX/VAG DOC THIN PREP: NORMAL
NOTE NL11727A: NORMAL
OTHER STN SPEC: NORMAL
STAT OF ADQ CVX/VAG CYTO-IMP: NORMAL

## 2024-05-21 DIAGNOSIS — E61.1 IRON DEFICIENCY: ICD-10-CM

## 2024-05-29 RX ORDER — EPINEPHRINE 1 MG/ML(1)
0.5 AMPUL (ML) INJECTION PRN
OUTPATIENT
Start: 2024-05-29

## 2024-05-29 RX ORDER — METHYLPREDNISOLONE SODIUM SUCCINATE 125 MG/2ML
125 INJECTION, POWDER, LYOPHILIZED, FOR SOLUTION INTRAMUSCULAR; INTRAVENOUS PRN
OUTPATIENT
Start: 2024-05-29

## 2024-05-29 RX ORDER — DIPHENHYDRAMINE HYDROCHLORIDE 50 MG/ML
50 INJECTION INTRAMUSCULAR; INTRAVENOUS PRN
OUTPATIENT
Start: 2024-05-29

## 2024-05-29 RX ORDER — SODIUM CHLORIDE 9 MG/ML
INJECTION, SOLUTION INTRAVENOUS CONTINUOUS
OUTPATIENT
Start: 2024-05-29